# Patient Record
Sex: MALE | Race: WHITE
[De-identification: names, ages, dates, MRNs, and addresses within clinical notes are randomized per-mention and may not be internally consistent; named-entity substitution may affect disease eponyms.]

---

## 2017-04-01 NOTE — ER DOCUMENT REPORT
ED Cardiac





<PELONANNA HOOVEROJ - Last Filed: 04/01/17 07:07>





- General


Time seen by provider: 06:55


Mode of Arrival: Ambulatory


Information source: Patient


TRAVEL OUTSIDE OF THE [place] IN LAST 30 DAYS: No





- HPI


Patient complains to provider of: Chest pain


Associated symptoms: Other - See above





<PARK LOPEZ - Last Filed: 04/01/17 07:28>





- General


Chief Complaint: Chest Pain > 30


Stated Complaint: CHEST PAIN


Notes: 


Patient is a 31 year old male, with a past medical history including POTS and 

anxiety, who presents to the emergency department complaining of chest pain 

intermittent for the past few weeks. Patient states that last night after work 

the pain worsened and he also experienced heat flash, nausea, and a copper 

taste in his mouth. Patient states the pain feels like "spasms" and it will 

sometimes radiate into his back, up his neck, or down his left arm. Patient 

reports that this pain is dissimilar to his POTS and anxiety in that it is more 

direct and makes his nauseated. Patient states he has been under a lot of 

stress recently and that he stopped taking his Inderal a year ago due to cost 

issues. Patient has no family history of ischemic coronary artery disease.     (

PARK LOPEZ)





- Related Data


Allergies/Adverse Reactions: 


 





tramadol HCl [From Ultram] Allergy (Severe, Verified 04/01/17 05:46)


 Swelling of Throat











Past Medical History





- General


Information source: Patient





- Social History


Smoking Status: Never Smoker


Chew tobacco use (# tins/day): No


Frequency of alcohol use: Occasional


Drug Abuse: None


Family History: Reviewed & Not Pertinent, Other - Migraine





- Past Medical History


Cardiac Medical History: Reports: Hx Hypertension - DX MORE THAN FIVE YEARS AGO


Pulmonary Medical History: Reports: Hx Asthma - DX AT AGE [age]


Neurological Medical History: Reports: Hx Seizures


GI Medical History: Reports: Hx Irritable Bowel


Psychiatric Medical History: Reports: Hx Anxiety - DX IN 2008, Hx Bipolar 

Disorder - DX IN 2008, Hx Depression - DX IN 2008, Hx Post Traumatic Stress 

Disorder - DX IN 2008


Past Surgical History: Reports: Hx Abdominal Surgery - APPENDIX, Hx Appendectomy

, Hx Orthopedic Surgery - left wrist





- Immunizations


Immunizations up to date: Yes


Hx Diphtheria, Pertussis, Tetanus Vaccination: Yes





<PARK LOPEZ - Last Filed: 04/01/17 07:28>





Review of Systems





- Review of Systems


Constitutional: See HPI, Other - hot flash


EENT: No symptoms reported


Cardiovascular: See HPI, Chest pain


Respiratory: No symptoms reported


Gastrointestinal: See HPI, Nausea - and "copper taste"


Genitourinary: No symptoms reported


Male Genitourinary: No symptoms reported


Musculoskeletal: No symptoms reported


Skin: No symptoms reported


Hematologic/Lymphatic: No symptoms reported


Neurological/Psychological: No symptoms reported


-: Yes All other systems reviewed and negative





<PARK LOPEZ - Last Filed: 04/01/17 07:28>





Physical Exam





- Vital signs


Interpretation: Normal





- General


General appearance: Appears well, Alert





- HEENT


Head: Normocephalic, Atraumatic





- Respiratory


Respiratory status: No respiratory distress


Chest status: Tender - Tenderness to palpation of left anterior chest wall that 

reproduces the chief complaint


Breath sounds: Normal


Chest palpation: Normal





- Cardiovascular


Rhythm: Regular


Heart sounds: Normal auscultation


Murmur: No





- Abdominal


Inspection: Normal


Distension: No distension


Bowel sounds: Normal


Tenderness: Nontender


Organomegaly: No organomegaly





- Back


Back: Normal, Nontender





- Extremities


General upper extremity: Normal inspection


General lower extremity: Normal inspection





- Neurological


Neuro grossly intact: Yes


Cognition: Normal


Orientation: AAOx4


Barrington Coma Scale Eye Opening: Spontaneous


Barrington Coma Scale Verbal: Oriented


Fady Coma Scale Motor: Obeys Commands


Barrington Coma Scale Total: 15


Speech: Normal





- Psychological


Associated symptoms: Anxious - admitted by patient





- Skin


Skin Temperature: Warm


Skin Moisture: Dry


Skin Color: Normal





<PARK LOPEZ - Last Filed: 04/01/17 07:28>





- Vital signs


Vitals: 


 











Temp Pulse Resp BP Pulse Ox


 


 97.8 F   95   14   148/97 H  98 


 


 04/01/17 05:48  04/01/17 05:48  04/01/17 05:48  04/01/17 05:48  04/01/17 05:48














Course





- Laboratory


Result Diagrams: 


 04/01/17 06:15





 04/01/17 06:15





- EKG Interpretation by Me


EKG shows normal: Sinus rhythm, Axis, Intervals, QRS Complexes, ST-T Waves


Rate: Normal - 86


Rhythm: NSR





<OJ BIRD - Last Filed: 04/01/17 07:07>





- Laboratory


Result Diagrams: 


 04/01/17 06:15





 04/01/17 06:15





<PARK LOPEZ - Last Filed: 04/01/17 07:28>





- Vital Signs


Vital signs: 


 











Temp Pulse Resp BP Pulse Ox


 


 98.1 F   95   17   132/84 H  97 


 


 04/01/17 07:01  04/01/17 05:48  04/01/17 07:01  04/01/17 07:01  04/01/17 07:01














- Laboratory


Laboratory results interpreted by me: 


 











  04/01/17





  06:15


 


WBC  10.8 H


 


Eosinophils %  7.6 H


 


Absolute Eosinophils  0.8 H














Discharge





<OJ BIRD - Last Filed: 04/01/17 07:07>





<PARK LOPEZ - Last Filed: 04/01/17 07:28>





- Discharge


Clinical Impression: 


 Chest wall pain, Anxiety, Has run out of medications, POTS (postural 

orthostatic tachycardia syndrome)





Condition: Stable


Disposition: HOME, SELF-CARE


Additional Instructions: 


Chest Wall Pain:





   Your chest pain has been diagnosed as coming from the chest wall.  This is 

often caused by straining the muscles or joints in the chest during physical 

activity, direct trauma, coughing, or vigorous vomiting.  Persons with 

arthritis are especially prone to this type of pain, due to inflammation of the 

cartilage joints near the breast bone.  Occasionally, no cause can be found.


   Rest from strenuous physical activity.  This kind of chest pain is usually 

made worse by movement of the chest.  Depending on the symptoms, we may 

prescribe medicine for pain, muscle relaxation, and antiinflammatory effects.


   If the pain is new, and seems to be due to muscle strain, cold packs can 

help. Otherwise, apply gentle warmth to the painful area for 15 minutes every 

hour or two.   


   You should contact the doctor immediately if things change.  Further 

evaluation is needed if you develop a fever or cough, if the nature of the pain 

changes, or if you become short of breath.


Prescriptions: 


Propranolol HCl 40 mg PO Q8 #90 tablet


Referrals: 


CHRIS DENTON DO [Primary Care Provider] - Follow up as needed


Scribe Attestation: 





04/01/17 07:06


I personally performed the services described in the documentation, reviewed 

and edited the documentation which was dictated to the scribe in my presence, 

and it accurately records my words and actions. (OJ BIRD)





Scribe Documentation





- Scribe


Written by Scribe:: kip Castaneda, 4/1/17, 0728


acting as scribe for :: Pelon





<PARK LOPEZ - Last Filed: 04/01/17 07:28>

## 2018-04-06 ENCOUNTER — HOSPITAL ENCOUNTER (EMERGENCY)
Dept: HOSPITAL 62 - ER | Age: 33
LOS: 1 days | Discharge: HOME | End: 2018-04-07
Payer: SELF-PAY

## 2018-04-06 DIAGNOSIS — N45.2: Primary | ICD-10-CM

## 2018-04-06 DIAGNOSIS — N50.812: ICD-10-CM

## 2018-04-06 LAB
APPEARANCE UR: CLEAR
APTT PPP: YELLOW S
BILIRUB UR QL STRIP: NEGATIVE
CHLAM PCR: NOT DETECTED
GLUCOSE UR STRIP-MCNC: NEGATIVE MG/DL
GON PCR: NOT DETECTED
KETONES UR STRIP-MCNC: NEGATIVE MG/DL
NITRITE UR QL STRIP: NEGATIVE
PH UR STRIP: 5 [PH] (ref 5–9)
PROT UR STRIP-MCNC: NEGATIVE MG/DL
SP GR UR STRIP: 1.03
UROBILINOGEN UR-MCNC: 2 MG/DL (ref ?–2)

## 2018-04-06 PROCEDURE — 87086 URINE CULTURE/COLONY COUNT: CPT

## 2018-04-06 PROCEDURE — 87591 N.GONORRHOEAE DNA AMP PROB: CPT

## 2018-04-06 PROCEDURE — 99284 EMERGENCY DEPT VISIT MOD MDM: CPT

## 2018-04-06 PROCEDURE — 93976 VASCULAR STUDY: CPT

## 2018-04-06 PROCEDURE — 87491 CHLMYD TRACH DNA AMP PROBE: CPT

## 2018-04-06 PROCEDURE — 76870 US EXAM SCROTUM: CPT

## 2018-04-06 PROCEDURE — 81001 URINALYSIS AUTO W/SCOPE: CPT

## 2018-04-06 NOTE — ER DOCUMENT REPORT
ED General





- General


Chief Complaint: Scrotal Pain, Acute Onset


Stated Complaint: LEFT TESTICLE PAIN


Time Seen by Provider: 04/06/18 20:44


Notes: 





Patient is a 32-year-old male without chronic medical problems who presents for 

24 hours of left testicular pain.  He describes it as a progressively worsening 

severe, constant throbbing pain to the left testicle.  Nothing improves or 

worsens the pain.  He states that he had a similar episode approximately 1 

month ago although not to this degree of severity.  He states that he had an 

appropriate workup at that time as to the etiology of his pain and it did 

spontaneously resolve.  He does note that his semen has been discolored over 

the past 24 hours having a brownish tinge to it.  He also has noted some 

dysuria today.  He notes that he was recently tested for sexually transmitted 

infections and all results were normal.  He denies any fever or constitutional 

symptoms.  No focal abdominal tenderness or flank tenderness.  He has not seen 

his primary care doctor regarding today's concerns.


TRAVEL OUTSIDE OF THE [place] IN LAST 30 DAYS: No





- Related Data


Allergies/Adverse Reactions: 


 





tramadol HCl [From Ultram] Allergy (Severe, Verified 04/01/17 05:46)


 Swelling of Throat











Past Medical History





- General


Information source: Patient





- Social History


Smoking Status: Never Smoker


Frequency of alcohol use: None


Drug Abuse: None


Lives with: Alone


Family History: Reviewed & Not Pertinent, Other - Migraine


Patient has suicidal ideation: No


Patient has homicidal ideation: No





- Past Medical History


Cardiac Medical History: Reports: Hx Hypertension - DX MORE THAN FIVE YEARS AGO


   Denies: Hx Coronary Artery Disease, Hx Heart Attack


Pulmonary Medical History: Reports: Hx Asthma - DX AT AGE [age]


   Denies: Hx Bronchitis, Hx COPD, Hx Pneumonia


Neurological Medical History: Reports: Hx Seizures.  Denies: Hx Cerebrovascular 

Accident


Endocrine Medical History: Denies: Hx Diabetes Mellitus Type 2


Renal/ Medical History: Denies: Hx Peritoneal Dialysis


GI Medical History: Reports: Hx Irritable Bowel.  Denies: Hx Crohn's Disease, 

Hx Gastritis, Hx Gastroesophageal Reflux Disease, Hx Hepatitis


Musculoskeltal Medical History: Denies Hx Arthritis


Skin Medical History: Denies Hx MRSA


Psychiatric Medical History: Reports: Hx Anxiety - DX IN 2008, Hx Bipolar 

Disorder - DX IN 2008, Hx Depression - DX IN 2008, Hx Post Traumatic Stress 

Disorder - DX IN 2008


Infectious Medical History: Denies: Hx Hepatitis, Hx MRSA


Past Surgical History: Reports: Hx Abdominal Surgery - APPENDIX, Hx Appendectomy

, Hx Orthopedic Surgery - left wrist.  Denies: Hx Adenoidectomy, Hx Pacemaker





- Immunizations


Immunizations up to date: Yes


Hx Diphtheria, Pertussis, Tetanus Vaccination: Yes





Review of Systems





- Review of Systems


Notes: 





Constitutional: Negative for fever.


HENT: Negative for sore throat.


Eyes: Negative for visual changes.


Cardiovascular: Negative for chest pain.


Respiratory: Negative for shortness of breath.


Gastrointestinal: Negative for abdominal pain, vomiting or diarrhea.


Genitourinary: Positive for dysuria and left testicular tenderness


Musculoskeletal: Negative for back pain.


Skin: Negative for rash.


Neurological: Negative for headaches, weakness or numbness.





10 point ROS negative except as marked above and in HPI.





Physical Exam





- Vital signs


Vitals: 


 











Temp Pulse Resp BP Pulse Ox


 


 98.0 F   85   17   128/85 H  98 


 


 04/07/18 00:08  04/07/18 00:08  04/07/18 00:08  04/07/18 00:08  04/07/18 00:08











Interpretation: Normal


Notes: 





PHYSICAL EXAMINATION:





GENERAL: Appears uncomfortable but no acute distress





HEAD: Atraumatic, normocephalic.





EYES: Pupils equal round and reactive to light, extraocular movements intact, 

sclera anicteric, conjunctiva are normal.





ENT: nares patent, oropharynx clear without exudates.  Moist mucous membranes.





NECK: Normal range of motion, supple without lymphadenopathy





LUNGS: Breath sounds clear to auscultation bilaterally and equal.  No wheezes 

rales or rhonchi.





HEART: Regular rate and rhythm without murmurs





ABDOMEN: Soft, nontender, normoactive bowel sounds.  No guarding, no rebound.  

No masses appreciated.





: Normal testicular lie bilaterally.  Absent cremasteric reflex bilaterally.  

Focal tenderness on palpation of the left testicle and epididymis.  No hernia.  

No inguinal lymphadenopathy





EXTREMITIES: Normal range of motion, no pitting or edema.  No cyanosis.





NEUROLOGICAL: No focal neurological deficits. Moves all extremities 

spontaneously and on command.





PSYCH: Normal mood, normal affect.





SKIN: Warm, Dry, normal turgor, no rashes or lesions noted.





Course





- Re-evaluation


Re-evalutation: 





04/06/18 22:18


Patient presents with approximately 12 hours of left testicular pain that has 

been progressively worsening since onset.  He has a history of a similar event 

although not to this degree of severity approximately 1 year ago that was 

unclear in the etiology.  Patient appears to be in significant discomfort.  On 

testicular exam, he has equal testicular lie, absent cremasteric reflex 

bilaterally.  Exquisite tenderness on palpation of the testicle and epididymis 

on the left, no tenderness on the right.  Urinalysis does suggest signs of a 

urinary tract infection suggesting possible orchitis or epididymitis.  

Alternative considerations would certainly include a testicular torsion.  I did 

order an ultrasound immediately upon evaluating this patient and he has not 

gone over an hour after I order the ultrasound.  I have emphasized with the 

ultrasound department and nursing staff that he needs to go for the ultrasound 

immediately to rule out for testicular torsion


04/06/18 23:34


Testicular ultrasound is not visualized of acute testicular torsion.  Given 

patient's urinalysis and focal left testicular tenderness most worrisome for 

possible orchitis.  Gonorrhea and Chlamydia are negative.  Will start on a seven

-day course of cephalexin, urine culture is pending.  I have provided the 

patient urology follow-up.  At this time will discharge with return precautions 

and follow-up recommendations.  Verbal discharge instructions given a the 

bedside and opportunity for questions given. Medication warnings reviewed. 

Patient is in agreement with this plan and has verbalized understanding of 

return precautions and the need for primary care follow-up in the next 24-72 

hours.





- Vital Signs


Vital signs: 


 











Temp Pulse Resp BP Pulse Ox


 


 98.0 F   85   17   128/85 H  98 


 


 04/07/18 00:08  04/07/18 00:08  04/07/18 00:08  04/07/18 00:08  04/07/18 00:08














- Laboratory


Laboratory results interpreted by me: 


 











  04/06/18





  20:56


 


Urine Urobilinogen  2.0 H














- Diagnostic Test


Radiology reviewed: Reports reviewed





Discharge





- Discharge


Clinical Impression: 


 Orchitis, Left testicular pain





Condition: Good


Disposition: HOME, SELF-CARE


Additional Instructions: 


You were seen for painful left testicle.  Your ultrasound does not show any 

evidence of a twisted testicle.  Your urine does however appear to be infected.

  Your gonorrhea and chlamydia screens are negative.  Your being started on a 

seven-day course of antibiotics to treat a likely infection based on your 

urinalysis findings.  For your pain: Take ibuprofen 600 mg and acetaminophen 

1000 mg every 6 hours together as needed for pain.  If this does not control 

your pain you may take 15 mg of oral morphine every 4 hours as needed.  Please 

be very careful about using the oral morphine and only use this for severe 

pain.  You should follow-up with urology within the next 2-3 days particularly 

if your pain is not improving.  Return to the emergency department immediately 

if you develop fever, worsening pain, persistent vomiting, or any other 

symptoms that are worrisome to you.


Prescriptions: 


Morphine Sulfate [Morphine Ir 15 mg Tablet] 15 mg PO Q4HP PRN #6 tablet


 PRN Reason: 


Cephalexin Monohydrate [Keflex 500 mg Capsule] 500 mg PO Q6H 7 Days  capsule


Referrals: 


CHRIS DENTON DO [Primary Care Provider] - Follow up as needed


CALEB GASPAR II, MD [[facility]] - Follow up in 3-5 days

## 2018-04-06 NOTE — RADIOLOGY REPORT (SQ)
EXAM DESCRIPTION:  U/S SCROTUM W/DOPPLER



COMPLETED DATE/TIME:  4/6/2018 11:05 pm



REASON FOR STUDY:  left testicle pain



COMPARISON:  None.



TECHNIQUE:  Static and realtime gray scale imaging of the scrotum and testes.  Selected color Doppler
 and spectral images recorded to document blood flow.



LIMITATIONS:  None.



FINDINGS:  RIGHT:

TESTICLE: Normal size. Normal echotexture.  Normal blood flow.  No mass.

EPIDIDYMIS: Normal.

HYDROCELE OR VARICOCELE: Small hydrocele No varicocele.

HERNIA OR EXTRA-TESTICULAR MASS: No.

OTHER: No other significant finding.

LEFT:

TESTICLE: Normal size. Normal echotexture.  Normal blood flow.  No mass.

EPIDIDYMIS: Normal.

HYDROCELE OR VARICOCELE: Small hydrocele No varicocele.

HERNIA OR EXTRA-TESTICULAR MASS: No.

OTHER: No other significant finding.



IMPRESSION:  Small bilateral hydroceles. NO EVIDENCE OF TESTICULAR MASS OR TORSION.



TECHNICAL DOCUMENTATION:  JOB ID:  [id number]

[id number]

 2011 [employer]- All Rights Reserved



Reading location - IP/workstation name: [employer] [facility]

## 2018-04-07 VITALS — DIASTOLIC BLOOD PRESSURE: 85 MMHG | SYSTOLIC BLOOD PRESSURE: 128 MMHG

## 2018-04-10 ENCOUNTER — HOSPITAL ENCOUNTER (INPATIENT)
Dept: HOSPITAL 62 - ER | Age: 33
LOS: 8 days | Discharge: HOME | DRG: 728 | End: 2018-04-18
Attending: INTERNAL MEDICINE | Admitting: INTERNAL MEDICINE
Payer: SELF-PAY

## 2018-04-10 DIAGNOSIS — F31.9: ICD-10-CM

## 2018-04-10 DIAGNOSIS — F43.10: ICD-10-CM

## 2018-04-10 DIAGNOSIS — A18.01: ICD-10-CM

## 2018-04-10 DIAGNOSIS — N49.2: ICD-10-CM

## 2018-04-10 DIAGNOSIS — I10: ICD-10-CM

## 2018-04-10 DIAGNOSIS — F41.9: ICD-10-CM

## 2018-04-10 DIAGNOSIS — N43.3: ICD-10-CM

## 2018-04-10 DIAGNOSIS — N45.3: Primary | ICD-10-CM

## 2018-04-10 DIAGNOSIS — Z88.6: ICD-10-CM

## 2018-04-10 DIAGNOSIS — K58.9: ICD-10-CM

## 2018-04-10 LAB
ADD MANUAL DIFF: NO
ANION GAP SERPL CALC-SCNC: 14 MMOL/L (ref 5–19)
APPEARANCE UR: CLEAR
APTT PPP: YELLOW S
BASOPHILS # BLD AUTO: 0.1 10^3/UL (ref 0–0.2)
BASOPHILS NFR BLD AUTO: 0.4 % (ref 0–2)
BILIRUB UR QL STRIP: NEGATIVE
BUN SERPL-MCNC: 15 MG/DL (ref 7–20)
CALCIUM: 9.6 MG/DL (ref 8.4–10.2)
CHLORIDE SERPL-SCNC: 103 MMOL/L (ref 98–107)
CO2 SERPL-SCNC: 26 MMOL/L (ref 22–30)
EOSINOPHIL # BLD AUTO: 0.5 10^3/UL (ref 0–0.6)
EOSINOPHIL NFR BLD AUTO: 2.8 % (ref 0–6)
ERYTHROCYTE [DISTWIDTH] IN BLOOD BY AUTOMATED COUNT: 13.5 % (ref 11.5–14)
GLUCOSE SERPL-MCNC: 97 MG/DL (ref 75–110)
GLUCOSE UR STRIP-MCNC: NEGATIVE MG/DL
HCT VFR BLD CALC: 44.7 % (ref 37.9–51)
HGB BLD-MCNC: 15 G/DL (ref 13.5–17)
KETONES UR STRIP-MCNC: NEGATIVE MG/DL
LYMPHOCYTES # BLD AUTO: 2.8 10^3/UL (ref 0.5–4.7)
LYMPHOCYTES NFR BLD AUTO: 14.8 % (ref 13–45)
MCH RBC QN AUTO: 28.2 PG (ref 27–33.4)
MCHC RBC AUTO-ENTMCNC: 33.6 G/DL (ref 32–36)
MCV RBC AUTO: 84 FL (ref 80–97)
MONOCYTES # BLD AUTO: 2 10^3/UL (ref 0.1–1.4)
MONOCYTES NFR BLD AUTO: 10.8 % (ref 3–13)
NEUTROPHILS # BLD AUTO: 13.3 10^3/UL (ref 1.7–8.2)
NEUTS SEG NFR BLD AUTO: 71.2 % (ref 42–78)
NITRITE UR QL STRIP: NEGATIVE
PH UR STRIP: 6 [PH] (ref 5–9)
PLATELET # BLD: 312 10^3/UL (ref 150–450)
POTASSIUM SERPL-SCNC: 4.1 MMOL/L (ref 3.6–5)
PROT UR STRIP-MCNC: NEGATIVE MG/DL
RBC # BLD AUTO: 5.33 10^6/UL (ref 4.35–5.55)
SODIUM SERPL-SCNC: 143 MMOL/L (ref 137–145)
SP GR UR STRIP: 1.01
TOTAL CELLS COUNTED % (AUTO): 100 %
UROBILINOGEN UR-MCNC: NEGATIVE MG/DL (ref ?–2)
WBC # BLD AUTO: 18.7 10^3/UL (ref 4–10.5)

## 2018-04-10 PROCEDURE — 85025 COMPLETE CBC W/AUTO DIFF WBC: CPT

## 2018-04-10 PROCEDURE — 87086 URINE CULTURE/COLONY COUNT: CPT

## 2018-04-10 PROCEDURE — 87070 CULTURE OTHR SPECIMN AEROBIC: CPT

## 2018-04-10 PROCEDURE — 81001 URINALYSIS AUTO W/SCOPE: CPT

## 2018-04-10 PROCEDURE — 96375 TX/PRO/DX INJ NEW DRUG ADDON: CPT

## 2018-04-10 PROCEDURE — 86140 C-REACTIVE PROTEIN: CPT

## 2018-04-10 PROCEDURE — 99285 EMERGENCY DEPT VISIT HI MDM: CPT

## 2018-04-10 PROCEDURE — 36415 COLL VENOUS BLD VENIPUNCTURE: CPT

## 2018-04-10 PROCEDURE — 76770 US EXAM ABDO BACK WALL COMP: CPT

## 2018-04-10 PROCEDURE — 87040 BLOOD CULTURE FOR BACTERIA: CPT

## 2018-04-10 PROCEDURE — 80048 BASIC METABOLIC PNL TOTAL CA: CPT

## 2018-04-10 PROCEDURE — 76870 US EXAM SCROTUM: CPT

## 2018-04-10 PROCEDURE — 93976 VASCULAR STUDY: CPT

## 2018-04-10 PROCEDURE — 96365 THER/PROPH/DIAG IV INF INIT: CPT

## 2018-04-10 PROCEDURE — 72193 CT PELVIS W/DYE: CPT

## 2018-04-10 PROCEDURE — 96361 HYDRATE IV INFUSION ADD-ON: CPT

## 2018-04-10 PROCEDURE — 96376 TX/PRO/DX INJ SAME DRUG ADON: CPT

## 2018-04-10 PROCEDURE — 87205 SMEAR GRAM STAIN: CPT

## 2018-04-10 NOTE — ER DOCUMENT REPORT
ED General





- General


Chief Complaint: Testicular pain/ swelling


Stated Complaint: TESTICULAR PAIN


Time Seen by Provider: 04/10/18 22:08


Notes: 





Patient is a 32-year-old male who presents with complaint of swelling 

increasing pain to the left testicle.  He was seen on Friday and diagnosed with 

full epididymitis.  His ultrasound that time was negative.  He started Keflex 

has been taking it as prescribed.  Said today his left scrotal region and 

testicle started to swell a large amount and is become very painful.  No 

fevers.  No vomiting.  No abdominal pain.  He was negative for gonorrhea 

chlamydia on Friday.  No recent trauma or injuries.  No sexual activity but he 

has masturbated.  He says that his ejaculate is somewhat bloody.


TRAVEL OUTSIDE OF THE [place] IN LAST 30 DAYS: No





- Related Data


Allergies/Adverse Reactions: 


 





tramadol HCl [From Ultram] Allergy (Severe, Verified 04/01/17 05:46)


 Swelling of Throat











Past Medical History





- Social History


Smoking Status: Never Smoker


Chew tobacco use (# tins/day): No


Frequency of alcohol use: Occasional


Drug Abuse: Marijuana


Family History: Reviewed & Not Pertinent, Other - Migraine


Patient has suicidal ideation: No


Patient has homicidal ideation: No





- Past Medical History


Cardiac Medical History: Reports: Hx Hypertension - DX MORE THAN FIVE YEARS AGO


   Denies: Hx Coronary Artery Disease, Hx Heart Attack


Pulmonary Medical History: Reports: Hx Asthma - DX AT AGE [age]


   Denies: Hx Bronchitis, Hx COPD, Hx Pneumonia


Neurological Medical History: Reports: Hx Seizures.  Denies: Hx Cerebrovascular 

Accident


Endocrine Medical History: Denies: Hx Diabetes Mellitus Type 2


Renal/ Medical History: Denies: Hx Peritoneal Dialysis


GI Medical History: Reports: Hx Irritable Bowel.  Denies: Hx Crohn's Disease, 

Hx Gastritis, Hx Gastroesophageal Reflux Disease, Hx Hepatitis


Musculoskeltal Medical History: Denies Hx Arthritis


Skin Medical History: Denies Hx MRSA


Psychiatric Medical History: Reports: Hx Anxiety - DX IN 2008, Hx Bipolar 

Disorder - DX IN 2008, Hx Depression - DX IN 2008, Hx Post Traumatic Stress 

Disorder - DX IN 2008


Infectious Medical History: Denies: Hx Hepatitis, Hx MRSA


Past Surgical History: Reports: Hx Abdominal Surgery - APPENDIX, Hx Appendectomy

, Hx Orthopedic Surgery - left wrist.  Denies: Hx Adenoidectomy, Hx Pacemaker





- Immunizations


Immunizations up to date: Yes


Hx Diphtheria, Pertussis, Tetanus Vaccination: Yes





Review of Systems





- Review of Systems


Notes: 





My Normal Review Basic





REVIEW OF SYSTEMS:


CONSTITUTIONAL :  Denies fever,  chills, or sweats.  Denies recent illness.


GASTROINTESTINAL:  Denies abdominal pain.  Denies nausea, vomiting, or 

diarrhea.  Denies constipation.  Last BM: 


GENITOURINARY:  Denies difficulty urinating, painful urination, burning, 

frequency, or blood in urine.


Male genital: Swelling and redness to left scrotal region.


SKIN:   Denies rash or skin lesions.


NEUROLOGICAL:  Denies altered mental status or loss of consciousness.  Denies 

headache.  Denies weakness or paralysis or loss of use of either side.  Denies 

problems with gait or speech.  Denies sensory or motor loss.


ALL OTHER SYSTEMS REVIEWED AND NEGATIVE.





Physical Exam





- Vital signs


Vitals: 


 











Temp Pulse Resp BP Pulse Ox


 


 98.1 F   134 H  16   151/96 H  97 


 


 04/10/18 21:32  04/10/18 21:32  04/10/18 21:32  04/10/18 21:32  04/10/18 21:32














- Notes


Notes: 





General Appearance: Well nourished, alert, cooperative, no acute distress, 

moderate obvious discomfort.


Vitals: reviewed, See vital signs table.


Eyes: PERRL, EOMI, Conjuctiva clear


Abdomen: Normal BS, soft, No rigidity, No abdominal tenderness, No guarding, no 

rebound, no abdominal masses, no organomegaly


Genital: Patient has large amount of swelling and redness to the left scrotal 

area and is firm however left scrotal area.  Tender to touch.


Skin: warm, dry, appropriate color, no rash


Neuro: speech clear, oriented x 3, normal affect, responds appropriately to 

questions.





Course





- Re-evaluation


Re-evalutation: 





04/11/18 00:14


Ultrasound shows worsening orchitis.  No evidence of abscess at this time.  We 

have urology coverage starting at 7 AM.  I did give him a dose of IV Rocephin.  

I did speak with the hospitalist Dr. Alba who agrees to admit the patient 

for IV antibiotics and consult with urology.  I did explain the plan to the 

patient and he is agreeable to it.  Patient is on propranolol for pots.  He did 

not have his evening dose today.  This may be why he is even more tachycardic.  

I think he is tachycardic because of that in conjunction with with the pain 

from his arthritis.  I will give a dose of his propranolol being that he has 

not had it.





Dictation of this chart was performed using voice recognition software; 

therefore, there may be some unintended grammatical errors.


04/11/18 00:15








- Vital Signs


Vital signs: 


 











Temp Pulse Resp BP Pulse Ox


 


 98.1 F   134 H  16   151/96 H  97 


 


 04/10/18 21:32  04/10/18 21:32  04/10/18 21:32  04/10/18 21:32  04/10/18 21:32














- Laboratory


Result Diagrams: 


 04/10/18 22:25





 04/10/18 22:25


Laboratory results interpreted by me: 


 











  04/10/18 04/10/18





  22:25 22:30


 


WBC  18.7 H 


 


Absolute Neutrophils  13.3 H 


 


Absolute Monocytes  2.0 H 


 


Urine Blood   SMALL H


 


Ur Leukocyte Esterase   SMALL H














Discharge





- Discharge


Clinical Impression: 


 Orchiditis





Condition: Stable


Disposition: ADMITTED AS OBSERVATION


Admitting Provider: Hospitalist


Unit Admitted: Medical Floor

## 2018-04-10 NOTE — RADIOLOGY REPORT (SQ)
EXAM DESCRIPTION: 



U/S SCROTUM W/DOPPLER



CLINICAL HISTORY: 



32 years, Male, testicular pain



COMPARISON:

None.



LIMITATIONS:

None.



FINDINGS:



Heterogeneous, edematous appearance of the 1.8 x 1.6 x 1.3 cm

left epididymis with increased vascularity. Mild increased

vascularity of the left testis. Small septated left hydrocele.

Asymmetric scrotal wall thickening measures 1.6 cm.



Small right hydrocele.



Else, 1.2 x 0.7 x 0.9 cm right epididymis, 4.2 cm right testis,

3.8 cm left testis, appear otherwise unremarkable. No testicular

torsion or mass.



IMPRESSION:



1. Moderate left epididymoorchitis. Asymmetric left paracentral

scrotal wall thickening measures 1.6 cm and may indicate scrotal

cellulitis.

2. No testicular torsion or mass.

## 2018-04-11 LAB
ADD MANUAL DIFF: NO
ANION GAP SERPL CALC-SCNC: 9 MMOL/L (ref 5–19)
BASOPHILS # BLD AUTO: 0.1 10^3/UL (ref 0–0.2)
BASOPHILS NFR BLD AUTO: 0.5 % (ref 0–2)
BUN SERPL-MCNC: 14 MG/DL (ref 7–20)
CALCIUM: 8.7 MG/DL (ref 8.4–10.2)
CHLORIDE SERPL-SCNC: 107 MMOL/L (ref 98–107)
CO2 SERPL-SCNC: 27 MMOL/L (ref 22–30)
EOSINOPHIL # BLD AUTO: 0.5 10^3/UL (ref 0–0.6)
EOSINOPHIL NFR BLD AUTO: 2.9 % (ref 0–6)
ERYTHROCYTE [DISTWIDTH] IN BLOOD BY AUTOMATED COUNT: 13 % (ref 11.5–14)
GLUCOSE SERPL-MCNC: 123 MG/DL (ref 75–110)
HCT VFR BLD CALC: 39.2 % (ref 37.9–51)
HGB BLD-MCNC: 13 G/DL (ref 13.5–17)
LYMPHOCYTES # BLD AUTO: 2.3 10^3/UL (ref 0.5–4.7)
LYMPHOCYTES NFR BLD AUTO: 13.2 % (ref 13–45)
MCH RBC QN AUTO: 27.7 PG (ref 27–33.4)
MCHC RBC AUTO-ENTMCNC: 33.1 G/DL (ref 32–36)
MCV RBC AUTO: 84 FL (ref 80–97)
MONOCYTES # BLD AUTO: 1.7 10^3/UL (ref 0.1–1.4)
MONOCYTES NFR BLD AUTO: 9.6 % (ref 3–13)
NEUTROPHILS # BLD AUTO: 13.1 10^3/UL (ref 1.7–8.2)
NEUTS SEG NFR BLD AUTO: 73.8 % (ref 42–78)
PLATELET # BLD: 248 10^3/UL (ref 150–450)
POTASSIUM SERPL-SCNC: 4.2 MMOL/L (ref 3.6–5)
RBC # BLD AUTO: 4.69 10^6/UL (ref 4.35–5.55)
SODIUM SERPL-SCNC: 142.6 MMOL/L (ref 137–145)
TOTAL CELLS COUNTED % (AUTO): 100 %
WBC # BLD AUTO: 17.7 10^3/UL (ref 4–10.5)

## 2018-04-11 PROCEDURE — 3E0F73Z INTRODUCTION OF ANTI-INFLAMMATORY INTO RESPIRATORY TRACT, VIA NATURAL OR ARTIFICIAL OPENING: ICD-10-PCS | Performed by: INTERNAL MEDICINE

## 2018-04-11 RX ADMIN — DOCUSATE SODIUM SCH MG: 100 CAPSULE, LIQUID FILLED ORAL at 09:18

## 2018-04-11 RX ADMIN — CEFTRIAXONE SODIUM SCH MG: 1 INJECTION, POWDER, FOR SOLUTION INTRAMUSCULAR; INTRAVENOUS at 09:43

## 2018-04-11 RX ADMIN — ACETAMINOPHEN SCH MG: 325 TABLET ORAL at 09:18

## 2018-04-11 RX ADMIN — HEPARIN SODIUM SCH UNIT: 5000 INJECTION, SOLUTION INTRAVENOUS; SUBCUTANEOUS at 06:55

## 2018-04-11 RX ADMIN — IBUPROFEN SCH MG: 400 TABLET ORAL at 14:54

## 2018-04-11 RX ADMIN — IBUPROFEN SCH MG: 400 TABLET ORAL at 21:26

## 2018-04-11 RX ADMIN — HEPARIN SODIUM SCH UNIT: 5000 INJECTION, SOLUTION INTRAVENOUS; SUBCUTANEOUS at 21:26

## 2018-04-11 RX ADMIN — ACETAMINOPHEN SCH MG: 325 TABLET ORAL at 00:15

## 2018-04-11 RX ADMIN — HEPARIN SODIUM SCH UNIT: 5000 INJECTION, SOLUTION INTRAVENOUS; SUBCUTANEOUS at 15:04

## 2018-04-11 RX ADMIN — ACETAMINOPHEN SCH MG: 325 TABLET ORAL at 17:56

## 2018-04-11 RX ADMIN — OXYCODONE HYDROCHLORIDE PRN MG: 5 TABLET ORAL at 02:27

## 2018-04-11 RX ADMIN — OXYCODONE HYDROCHLORIDE PRN MG: 5 TABLET ORAL at 09:19

## 2018-04-11 RX ADMIN — ACETAMINOPHEN SCH MG: 325 TABLET ORAL at 00:50

## 2018-04-11 RX ADMIN — DOCUSATE SODIUM SCH MG: 100 CAPSULE, LIQUID FILLED ORAL at 17:50

## 2018-04-11 RX ADMIN — OXYCODONE HYDROCHLORIDE PRN MG: 5 TABLET ORAL at 17:50

## 2018-04-11 NOTE — PDOC PROGRESS REPORT
Subjective


Progress Note for:: 04/11/18


Subjective:: 





Admitted overnight for epididymitis.  Complains of pain in scrotum.  No fever 

or chills, no chest pain or shortness of breath or palpitations.  Patient seen 

by urologist Leoncio today and he stated no abscess, and no surgical intervention 

needed at this time.  Recommend continue Rocephin, around-the-clock ibuprofen, 

elevate the scrotum.


Reason For Visit: 


EPIDIDYMITMS








Physical Exam


Vital Signs: 


 











Temp Pulse Resp BP Pulse Ox


 


 98.9 F   94   16   109/71   96 


 


 04/11/18 12:00  04/11/18 12:00  04/11/18 12:00  04/11/18 12:00  04/11/18 12:00








 Intake & Output











 04/10/18 04/11/18 04/12/18





 06:59 06:59 06:59


 


Weight  104.2 kg 








GENERAL: Well-developed, no acute distress


HEENT: Normocephalic/atraumatic


NECK supple, no JVD


CARDIOVASCULAR: RRR, normal S1-S2


LUNGS: CTA bilaterally


ABDOMEN: Soft, NT, NL bowel sounds


EXTREMITIES: No edema, clubbing, cyanosis


: Significant edema of scrotum with some redness, my involvement, tenderness


NEUROLOGICAL: Alert, oriented x 3, nonfocal








Results


Laboratory Results: 


 





 04/11/18 04:01 





 04/11/18 04:01 





 











  04/11/18 04/11/18





  04:01 04:01


 


WBC  17.7 H 


 


RBC  4.69 


 


Hgb  13.0 L 


 


Hct  39.2 


 


MCV  84 


 


MCH  27.7 


 


MCHC  33.1 


 


RDW  13.0 


 


Plt Count  248 


 


Seg Neutrophils %  73.8 


 


Lymphocytes %  13.2 


 


Monocytes %  9.6 


 


Eosinophils %  2.9 


 


Basophils %  0.5 


 


Absolute Neutrophils  13.1 H 


 


Absolute Lymphocytes  2.3 


 


Absolute Monocytes  1.7 H 


 


Absolute Eosinophils  0.5 


 


Absolute Basophils  0.1 


 


Sodium   142.6


 


Potassium   4.2


 


Chloride   107


 


Carbon Dioxide   27


 


Anion Gap   9


 


BUN   14


 


Creatinine   0.81


 


Est GFR ( Amer)   > 60


 


Est GFR (Non-Af Amer)   > 60


 


Glucose   123 H


 


Calcium   8.7











Impressions: 


 





Scrotum Ultrasound  04/10/18 22:10


IMPRESSION:


 


1. Moderate left epididymoorchitis. Asymmetric left paracentral


scrotal wall thickening measures 1.6 cm and may indicate scrotal


cellulitis.


2. No testicular torsion or mass.


 














Assessment & Plan





- Plan Summary


Plan Summary: 





(1) Orchitis


Is this a current diagnosis for this admission?: Yes   


Plan: 


Continue symptomatic management, empiric Rocephin, follow-up CBC, blood and 

urine culture, urology consult appreciated.





(2) Left testicular pain


Is this a current diagnosis for this admission?: Yes   


Plan: 


Symptomatic management.  Elevate scrotum, around-the-clock ibuprofen by urology 

recommendation.

## 2018-04-11 NOTE — PDOC H&P
History of Present Illness


Admission Date/PCP: 


  04/11/18 00:37





  MERON DENTON MD





Patient complains of: 7 days of left testicular pain


History of Present Illness: 


AUSTIN HARRISON is a 32 year old male with past medical history of Mishra disease 

on propanolol.  Patient presents with follow-up of left testicular epididymitis

, swelling and pain patient was seen treated and discharged 4 days ago with 

Keflex without significant improvement.  In the emergency room CBC reveals 

leukocytosis of 18,000, ultrasound reveals acute epididymitis without torsion 

he started on IV Rocephin for the hospitalist for admission.  Patient admits to 

single episode several years ago, denies exceptional STD risk, Chlamydia 

gonorrhea returned negative from 4 days ago.





Past Medical History


Cardiac Medical History: Reports: Hypertension - DX MORE THAN FIVE YEARS AGO


   Denies: Coronary Artery Disease, Myocardial Infarction


Pulmonary Medical History: Reports: Asthma - DX AT AGE [age]


   Denies: Bronchitis, Chronic Obstructive Pulmonary Disease (COPD), Pneumonia


Neurological Medical History: Reports: Seizures


Endocrine Medical History: 


   Denies: Diabetes Mellitus Type 2


GI Medical History: 


   Denies: Crohn's Disease, Gastroesophageal Reflux Disease, Hepatitis


Musculoskeltal Medical History: 


   Denies: Arthritis


Psychiatric Medical History: Reports: Bipolar Disorder - DX IN 2008, Depression 

- DX IN 2008, Post Traumatic Stress Disorder - DX IN 2008


Hematology: 


   Denies: Anemia


Infectious Medical History: 


   Denies: Methicillin-Resistant Staph Aureus





Past Surgical History


Past Surgical History: Reports: Appendectomy, Orthopedic Surgery - left wrist


   Denies: Pacemaker





Social History


Information Source: Patient, Emergency Med Personnel, [facility] Records


Smoking Status: Never Smoker


Frequency of Alcohol Use: None


Drugs: None





- Advance Directive


Resuscitation Status: Full Code





Family History


Family History: Other - Migraine


Parental Family History Reviewed: Yes


Children Family History Reviewed: Yes


Sibling(s) Family History Reviewed.: Yes





Medication/Allergy


Home Medications: 








Propranolol HCl 40 mg PO Q8 #90 tablet 04/01/17 








Allergies/Adverse Reactions: 


 





tramadol HCl [From Ultram] Allergy (Severe, Verified 04/01/17 05:46)


 Swelling of Throat











Review of Systems


Constitutional: ABSENT: chills, fever(s), headache(s), weight gain, weight loss


Eyes: ABSENT: visual disturbances


Ears: ABSENT: hearing changes


Cardiovascular: ABSENT: chest pain, dyspnea on exertion, edema, orthropnea, 

palpitations


Respiratory: ABSENT: cough, hemoptysis


Gastrointestinal: ABSENT: abdominal pain, constipation, diarrhea, hematemesis, 

hematochezia, nausea, vomiting


Genitourinary: ABSENT: dysuria, hematuria


Musculoskeletal: ABSENT: joint swelling


Integumentary: ABSENT: rash, wounds


Neurological: ABSENT: abnormal gait, abnormal speech, confusion, dizziness, 

focal weakness, syncope


Psychiatric: ABSENT: anxiety, depression, homidical ideation, suicidal ideation


Endocrine: ABSENT: cold intolerance, heat intolerance, polydipsia, polyuria


Hematologic/Lymphatic: ABSENT: easy bleeding, easy bruising





Physical Exam


Vital Signs: 


 











Temp Pulse Resp BP Pulse Ox


 


 99.1 F   111 H  18   117/81   97 


 


 04/11/18 00:36  04/11/18 00:36  04/11/18 00:36  04/11/18 00:36  04/11/18 00:36











General appearance: PRESENT: cooperative, mild distress, well-developed, well-

nourished


Head exam: PRESENT: atraumatic, normocephalic


Eye exam: PRESENT: conjunctiva pink, EOMI, PERRLA.  ABSENT: scleral icterus


Ear exam: PRESENT: normal external ear exam


Mouth exam: PRESENT: moist, tongue midline


Neck exam: ABSENT: carotid bruit, JVD, lymphadenopathy, thyromegaly


Respiratory exam: PRESENT: clear to auscultation diamond.  ABSENT: rales, rhonchi, 

wheezes


Cardiovascular exam: PRESENT: RRR.  ABSENT: diastolic murmur, rubs, systolic 

murmur


Pulses: PRESENT: normal dorsalis pedis pul


Vascular exam: PRESENT: normal capillary refill


GI/Abdominal exam: PRESENT: normal bowel sounds, soft.  ABSENT: distended, 

guarding, mass, organolmegaly, rebound, tenderness


Rectal exam: PRESENT: deferred


Gentrourinary exam: PRESENT: scrotal swelling, testicular tenderness - Left-

sided posterior tenderness, enlarged indurated 4 x 5 cm, no perineum or gluteal 

pain.  ABSENT: urethral discharge, indwelling catheter


Extremities exam: PRESENT: full ROM.  ABSENT: calf tenderness, clubbing, pedal 

edema


Neurological exam: PRESENT: alert, awake, oriented to person, oriented to place

, oriented to time, oriented to situation, CN II-XII grossly intact.  ABSENT: 

motor sensory deficit


Psychiatric exam: PRESENT: appropriate affect, normal mood.  ABSENT: homicidal 

ideation, suicidal ideation


Skin exam: PRESENT: dry, intact, warm.  ABSENT: cyanosis, rash





Results


Impressions: 


 





Scrotum Ultrasound  04/10/18 22:10


IMPRESSION:


 


1. Moderate left epididymoorchitis. Asymmetric left paracentral


scrotal wall thickening measures 1.6 cm and may indicate scrotal


cellulitis.


2. No testicular torsion or mass.


 














Assessment & Plan





- Diagnosis


(1) Orchitis


Is this a current diagnosis for this admission?: Yes   


Plan: 


Symptomatic management, empiric Rocephin, follow-up CBC, blood and urine culture

, consider urology consult.








(2) Left testicular pain


Is this a current diagnosis for this admission?: Yes   


Plan: 


Symptomatic management.  Correction #1








- Time


Time Spent: 30 to 50 Minutes





- Inpatient Certification


Medical Necessity: Need Close Monitoring Due to Risk of Patient Decompensation

## 2018-04-12 LAB
ADD MANUAL DIFF: NO
ANION GAP SERPL CALC-SCNC: 10 MMOL/L (ref 5–19)
APPEARANCE UR: CLEAR
APTT PPP: YELLOW S
BASOPHILS # BLD AUTO: 0 10^3/UL (ref 0–0.2)
BASOPHILS NFR BLD AUTO: 0.2 % (ref 0–2)
BILIRUB UR QL STRIP: NEGATIVE
BUN SERPL-MCNC: 15 MG/DL (ref 7–20)
CALCIUM: 9.4 MG/DL (ref 8.4–10.2)
CHLORIDE SERPL-SCNC: 105 MMOL/L (ref 98–107)
CO2 SERPL-SCNC: 30 MMOL/L (ref 22–30)
EOSINOPHIL # BLD AUTO: 0.5 10^3/UL (ref 0–0.6)
EOSINOPHIL NFR BLD AUTO: 3.3 % (ref 0–6)
ERYTHROCYTE [DISTWIDTH] IN BLOOD BY AUTOMATED COUNT: 13.2 % (ref 11.5–14)
GLUCOSE SERPL-MCNC: 100 MG/DL (ref 75–110)
GLUCOSE UR STRIP-MCNC: NEGATIVE MG/DL
HCT VFR BLD CALC: 39.9 % (ref 37.9–51)
HGB BLD-MCNC: 13.2 G/DL (ref 13.5–17)
KETONES UR STRIP-MCNC: NEGATIVE MG/DL
LYMPHOCYTES # BLD AUTO: 2 10^3/UL (ref 0.5–4.7)
LYMPHOCYTES NFR BLD AUTO: 12.5 % (ref 13–45)
MCH RBC QN AUTO: 27.9 PG (ref 27–33.4)
MCHC RBC AUTO-ENTMCNC: 33 G/DL (ref 32–36)
MCV RBC AUTO: 84 FL (ref 80–97)
MONOCYTES # BLD AUTO: 2.1 10^3/UL (ref 0.1–1.4)
MONOCYTES NFR BLD AUTO: 12.9 % (ref 3–13)
NEUTROPHILS # BLD AUTO: 11.4 10^3/UL (ref 1.7–8.2)
NEUTS SEG NFR BLD AUTO: 71.1 % (ref 42–78)
NITRITE UR QL STRIP: NEGATIVE
PH UR STRIP: 6 [PH] (ref 5–9)
PLATELET # BLD: 265 10^3/UL (ref 150–450)
POTASSIUM SERPL-SCNC: 4.5 MMOL/L (ref 3.6–5)
PROT UR STRIP-MCNC: NEGATIVE MG/DL
RBC # BLD AUTO: 4.73 10^6/UL (ref 4.35–5.55)
SODIUM SERPL-SCNC: 144.9 MMOL/L (ref 137–145)
SP GR UR STRIP: 1.01
TOTAL CELLS COUNTED % (AUTO): 100 %
UROBILINOGEN UR-MCNC: NEGATIVE MG/DL (ref ?–2)
WBC # BLD AUTO: 16 10^3/UL (ref 4–10.5)

## 2018-04-12 RX ADMIN — OXYCODONE HYDROCHLORIDE PRN MG: 5 TABLET ORAL at 14:46

## 2018-04-12 RX ADMIN — DOCUSATE SODIUM SCH MG: 100 CAPSULE, LIQUID FILLED ORAL at 17:26

## 2018-04-12 RX ADMIN — IBUPROFEN SCH MG: 400 TABLET ORAL at 23:00

## 2018-04-12 RX ADMIN — OXYCODONE HYDROCHLORIDE PRN MG: 5 TABLET ORAL at 09:02

## 2018-04-12 RX ADMIN — CEFTRIAXONE SODIUM SCH MG: 1 INJECTION, POWDER, FOR SOLUTION INTRAMUSCULAR; INTRAVENOUS at 09:03

## 2018-04-12 RX ADMIN — IBUPROFEN SCH MG: 400 TABLET ORAL at 08:23

## 2018-04-12 RX ADMIN — HEPARIN SODIUM SCH UNIT: 5000 INJECTION, SOLUTION INTRAVENOUS; SUBCUTANEOUS at 13:40

## 2018-04-12 RX ADMIN — DOCUSATE SODIUM SCH MG: 100 CAPSULE, LIQUID FILLED ORAL at 09:02

## 2018-04-12 RX ADMIN — ACETAMINOPHEN SCH MG: 325 TABLET ORAL at 09:02

## 2018-04-12 RX ADMIN — HEPARIN SODIUM SCH UNIT: 5000 INJECTION, SOLUTION INTRAVENOUS; SUBCUTANEOUS at 00:00

## 2018-04-12 RX ADMIN — IBUPROFEN SCH MG: 400 TABLET ORAL at 14:47

## 2018-04-12 RX ADMIN — ACETAMINOPHEN SCH MG: 325 TABLET ORAL at 16:39

## 2018-04-12 RX ADMIN — HEPARIN SODIUM SCH UNIT: 5000 INJECTION, SOLUTION INTRAVENOUS; SUBCUTANEOUS at 23:29

## 2018-04-12 NOTE — RADIOLOGY REPORT (SQ)
EXAM DESCRIPTION:  U/S RETROPERITON (RENAL/AORTA)



COMPLETED DATE/TIME:  4/12/2018 5:10 pm



REASON FOR STUDY:  abdominal pain



COMPARISON:  CT abdomen pelvis 8/28/2010



TECHNIQUE:  Dynamic and static grayscale images acquired of the kidneys and bladder and recorded on P
ACS. Additional selected color Doppler and spectral images recorded.



LIMITATIONS:  None.



FINDINGS:  RIGHT KIDNEY: 11.6 cm in length. Normal echogenicity. No solid or suspicious masses. No hy
dronephrosis.  5 to 7 mm echogenic shadowing focus right mid-pole kidney likely an intrarenal nonobst
ructive stone.

LEFT KIDNEY:  12 cm in length. Normal echogenicity. No solid or suspicious masses.  1 cm cyst left lo
wer pole kidney.  No hydronephrosis. No calcifications.

BLADDER: Decompressed not well seen

OTHER FINDINGS: No other significant finding.



IMPRESSION:  No right or left hydronephrosis

5 to 7 mm right midpole intrarenal nonobstructive calculus

Bladder not visualized



TECHNICAL DOCUMENTATION:  JOB ID:  [id number]

 2011 [employer]- All Rights Reserved



Reading location - IP/workstation name: [facility]

## 2018-04-12 NOTE — PDOC PROGRESS REPORT
Subjective


Progress Note for:: 04/12/18


Subjective:: 





Admitted 4/11 for epididymitis.  Complains of pain in scrotum, though 

improving.  Swelling also improving.  No fever or chills, no chest pain or 

shortness of breath or palpitations.  Patient is being followed by urologist 

Leoncio today and he stated no abscess, and no surgical intervention needed at 

this time.  Recommend continue Rocephin, around-the-clock ibuprofen, elevate 

the scrotum.  Also has recommended rechecking scrotal ultrasound prior to 

discharge, and checking kidney ultrasound today.


Reason For Visit: 


EPIDIDYMITMS








Physical Exam


Vital Signs: 


 











Temp Pulse Resp BP Pulse Ox


 


 98.2 F   96   17   124/79   97 


 


 04/12/18 15:07  04/12/18 15:07  04/12/18 15:07  04/12/18 15:07  04/12/18 15:07








 Intake & Output











 04/11/18 04/12/18 04/13/18





 06:59 06:59 06:59


 


Intake Total  965 350


 


Output Total  1200 


 


Balance  -235 350


 


Weight 104.2 kg 107.1 kg 














GENERAL: Well-developed, no acute distress


HEENT: Normocephalic/atraumatic


NECK supple, no JVD


CARDIOVASCULAR: RRR, normal S1-S2


LUNGS: CTA bilaterally


ABDOMEN: Soft, NT, NL bowel sounds


EXTREMITIES: No edema, clubbing, cyanosis


: Significant edema of scrotum with some redness, tenderness


NEUROLOGICAL: Alert, oriented x 3, nonfocal








Results


Laboratory Results: 


 





 04/12/18 06:25 





 04/12/18 06:25 





 











  04/12/18 04/12/18 04/12/18





  06:25 06:25 13:00


 


WBC  16.0 H  


 


RBC  4.73  


 


Hgb  13.2 L  


 


Hct  39.9  


 


MCV  84  


 


MCH  27.9  


 


MCHC  33.0  


 


RDW  13.2  


 


Plt Count  265  


 


Seg Neutrophils %  71.1  


 


Lymphocytes %  12.5 L  


 


Monocytes %  12.9  


 


Eosinophils %  3.3  


 


Basophils %  0.2  


 


Absolute Neutrophils  11.4 H  


 


Absolute Lymphocytes  2.0  


 


Absolute Monocytes  2.1 H  


 


Absolute Eosinophils  0.5  


 


Absolute Basophils  0.0  


 


Sodium   144.9 


 


Potassium   4.5 


 


Chloride   105 


 


Carbon Dioxide   30 


 


Anion Gap   10 


 


BUN   15 


 


Creatinine   0.89 


 


Est GFR ( Amer)   > 60 


 


Est GFR (Non-Af Amer)   > 60 


 


Glucose   100 


 


Calcium   9.4 


 


Urine Color    YELLOW


 


Urine Appearance    CLEAR


 


Urine pH    6.0


 


Ur Specific Gravity    1.009


 


Urine Protein    NEGATIVE


 


Urine Glucose (UA)    NEGATIVE


 


Urine Ketones    NEGATIVE


 


Urine Blood    NEGATIVE


 


Urine Nitrite    NEGATIVE


 


Ur Leukocyte Esterase    NEGATIVE


 


Urine WBC (Auto)    8


 


Urine RBC (Auto)    0











Impressions: 


 





Scrotum Ultrasound  04/10/18 22:10


IMPRESSION:


 


1. Moderate left epididymoorchitis. Asymmetric left paracentral


scrotal wall thickening measures 1.6 cm and may indicate scrotal


cellulitis.


2. No testicular torsion or mass.


 














Assessment & Plan





- Plan Summary


Plan Summary: 





(1) Orchitis


Is this a current diagnosis for this admission?: Yes   


Plan: 


Continue symptomatic management, empiric Rocephin, white blood cell slightly 

done today although still elevated, follow-up CBC, blood and urine culture, 

urology consult appreciated.


-Urology follow-up appreciated.  Recommending scrotal ultrasound prior to 

discharge and kidney ultrasound today.





(2) Left testicular pain


Is this a current diagnosis for this admission?: Yes   


Plan: 


Symptomatic management.  Elevate scrotum, around-the-clock ibuprofen per 

urology recommendation.

## 2018-04-13 LAB
ADD MANUAL DIFF: NO
ANION GAP SERPL CALC-SCNC: 11 MMOL/L (ref 5–19)
BASOPHILS # BLD AUTO: 0 10^3/UL (ref 0–0.2)
BASOPHILS NFR BLD AUTO: 0.4 % (ref 0–2)
BUN SERPL-MCNC: 15 MG/DL (ref 7–20)
CALCIUM: 9.3 MG/DL (ref 8.4–10.2)
CHLORIDE SERPL-SCNC: 108 MMOL/L (ref 98–107)
CO2 SERPL-SCNC: 26 MMOL/L (ref 22–30)
EOSINOPHIL # BLD AUTO: 0.7 10^3/UL (ref 0–0.6)
EOSINOPHIL NFR BLD AUTO: 5.4 % (ref 0–6)
ERYTHROCYTE [DISTWIDTH] IN BLOOD BY AUTOMATED COUNT: 13.1 % (ref 11.5–14)
GLUCOSE SERPL-MCNC: 89 MG/DL (ref 75–110)
HCT VFR BLD CALC: 40.1 % (ref 37.9–51)
HGB BLD-MCNC: 13.3 G/DL (ref 13.5–17)
LYMPHOCYTES # BLD AUTO: 2.5 10^3/UL (ref 0.5–4.7)
LYMPHOCYTES NFR BLD AUTO: 18.5 % (ref 13–45)
MCH RBC QN AUTO: 27.8 PG (ref 27–33.4)
MCHC RBC AUTO-ENTMCNC: 33.2 G/DL (ref 32–36)
MCV RBC AUTO: 84 FL (ref 80–97)
MONOCYTES # BLD AUTO: 1.4 10^3/UL (ref 0.1–1.4)
MONOCYTES NFR BLD AUTO: 10.2 % (ref 3–13)
NEUTROPHILS # BLD AUTO: 8.8 10^3/UL (ref 1.7–8.2)
NEUTS SEG NFR BLD AUTO: 65.5 % (ref 42–78)
PLATELET # BLD: 288 10^3/UL (ref 150–450)
POTASSIUM SERPL-SCNC: 4.6 MMOL/L (ref 3.6–5)
RBC # BLD AUTO: 4.8 10^6/UL (ref 4.35–5.55)
SODIUM SERPL-SCNC: 144.9 MMOL/L (ref 137–145)
TOTAL CELLS COUNTED % (AUTO): 100 %
WBC # BLD AUTO: 13.4 10^3/UL (ref 4–10.5)

## 2018-04-13 RX ADMIN — OXYCODONE HYDROCHLORIDE PRN MG: 5 TABLET ORAL at 09:41

## 2018-04-13 RX ADMIN — CEFTRIAXONE SODIUM SCH MG: 1 INJECTION, POWDER, FOR SOLUTION INTRAMUSCULAR; INTRAVENOUS at 09:34

## 2018-04-13 RX ADMIN — PROPRANOLOL HYDROCHLORIDE SCH MG: 40 TABLET ORAL at 22:32

## 2018-04-13 RX ADMIN — IBUPROFEN SCH MG: 400 TABLET ORAL at 22:31

## 2018-04-13 RX ADMIN — OXYCODONE HYDROCHLORIDE PRN MG: 5 TABLET ORAL at 16:12

## 2018-04-13 RX ADMIN — HEPARIN SODIUM SCH UNIT: 5000 INJECTION, SOLUTION INTRAVENOUS; SUBCUTANEOUS at 14:46

## 2018-04-13 RX ADMIN — IBUPROFEN SCH MG: 400 TABLET ORAL at 06:55

## 2018-04-13 RX ADMIN — PROPRANOLOL HYDROCHLORIDE SCH MG: 40 TABLET ORAL at 14:43

## 2018-04-13 RX ADMIN — IBUPROFEN SCH MG: 400 TABLET ORAL at 14:46

## 2018-04-13 RX ADMIN — ACETAMINOPHEN SCH MG: 325 TABLET ORAL at 16:12

## 2018-04-13 RX ADMIN — PROPRANOLOL HYDROCHLORIDE SCH MG: 40 TABLET ORAL at 09:27

## 2018-04-13 RX ADMIN — DOCUSATE SODIUM SCH MG: 100 CAPSULE, LIQUID FILLED ORAL at 09:34

## 2018-04-13 RX ADMIN — HEPARIN SODIUM SCH UNIT: 5000 INJECTION, SOLUTION INTRAVENOUS; SUBCUTANEOUS at 05:00

## 2018-04-13 RX ADMIN — ACETAMINOPHEN SCH MG: 325 TABLET ORAL at 00:15

## 2018-04-13 RX ADMIN — ACETAMINOPHEN SCH MG: 325 TABLET ORAL at 09:31

## 2018-04-13 RX ADMIN — DOCUSATE SODIUM SCH MG: 100 CAPSULE, LIQUID FILLED ORAL at 17:17

## 2018-04-13 NOTE — PDOC PROGRESS REPORT
Subjective


Progress Note for:: 04/13/18


Subjective:: 





Admitted 4/11 for epididymitis.  Complains of pain in scrotum still, though 

continues to improve and swelling much better.  No fever or chills, no chest 

pain or shortness of breath or palpitations.  Patient is being followed by 

urologist Leoncio and he stated no abscess, and no surgical intervention needed at 

this time.  Recommend continue Rocephin, around-the-clock ibuprofen, elevate 

the scrotum.  Also has recommended rechecking scrotal ultrasound prior to 

discharge, and checking kidney ultrasound 4/12 --result of.  





Today the urologist recommended stopping Rocephin after today's dose and 

transitioning to p.o. antibiotics by tomorrow watching overnight after starting 

p.o. antibiotics to ensure no fevers and a white count remaining improved.


Reason For Visit: 


EPIDIDYMITMS








Physical Exam


Vital Signs: 


 











Temp Pulse Resp BP Pulse Ox


 


 98.2 F   71   17   122/74   97 


 


 04/13/18 11:43  04/13/18 11:43  04/13/18 11:43  04/13/18 11:43  04/13/18 11:43








 Intake & Output











 04/12/18 04/13/18 04/14/18





 06:59 06:59 06:59


 


Intake Total 965 350 


 


Output Total 1200 300 


 


Balance -235 50 


 


Weight 107.1 kg 101.8 kg 101.8 kg











GENERAL: Well-developed, no acute distress


HEENT: Normocephalic/atraumatic


NECK supple, no JVD


CARDIOVASCULAR: RRR, normal S1-S2


LUNGS: CTA bilaterally


ABDOMEN: Soft, NT, NL bowel sounds


EXTREMITIES: No edema, clubbing, cyanosis


: Edema of scrotum but improved, also improvement in tenderness


NEUROLOGICAL: Alert, oriented x 3, nonfocal








Results


Laboratory Results: 


 





 04/13/18 06:22 





 04/13/18 06:22 





 











  04/13/18 04/13/18





  06:22 06:22


 


WBC  13.4 H 


 


RBC  4.80 


 


Hgb  13.3 L 


 


Hct  40.1 


 


MCV  84 


 


MCH  27.8 


 


MCHC  33.2 


 


RDW  13.1 


 


Plt Count  288 


 


Seg Neutrophils %  65.5 


 


Lymphocytes %  18.5 


 


Monocytes %  10.2 


 


Eosinophils %  5.4 


 


Basophils %  0.4 


 


Absolute Neutrophils  8.8 H 


 


Absolute Lymphocytes  2.5 


 


Absolute Monocytes  1.4 


 


Absolute Eosinophils  0.7 H 


 


Absolute Basophils  0.0 


 


Sodium   144.9


 


Potassium   4.6


 


Chloride   108 H


 


Carbon Dioxide   26


 


Anion Gap   11


 


BUN   15


 


Creatinine   0.78


 


Est GFR ( Amer)   > 60


 


Est GFR (Non-Af Amer)   > 60


 


Glucose   89


 


Calcium   9.3











Impressions: 


 





Scrotum Ultrasound  04/10/18 22:10


IMPRESSION:


 


1. Moderate left epididymoorchitis. Asymmetric left paracentral


scrotal wall thickening measures 1.6 cm and may indicate scrotal


cellulitis.


2. No testicular torsion or mass.


 








Renal Ultrasound  04/12/18 00:00


IMPRESSION:  No right or left hydronephrosis


5 to 7 mm right midpole intrarenal nonobstructive calculus


Bladder not visualized


 








Assessment & Plan





- Plan Summary


Plan Summary: 





(1) Orchitis


Is this a current diagnosis for this admission?: Yes   


Plan: 


Continue symptomatic management, empiric Rocephin, white blood cell slightly 

done today although still elevated, follow-up CBC, blood and urine culture, 

urology consult appreciated.


-Urology follow-up appreciated.  Recommending scrotal ultrasound prior to 

discharge. Kidney ultrasound 4/12 as an imaging above.


-Urology recommended stopping IV antibiotics after today's dose and starting 

p.o. antibiotics tomorrow.  May recheck scrotal ultrasound after 24 hours of 

p.o. antibiotics if no fever or increase in white count, and if no abscess 

repeat scrotal ultrasound, DC home.





(2) Left testicular pain


Is this a current diagnosis for this admission?: Yes   


Plan: 


Symptomatic management.  Elevate scrotum, ibuprofen per urology recommendation.

## 2018-04-14 LAB
ADD MANUAL DIFF: NO
ANION GAP SERPL CALC-SCNC: 12 MMOL/L (ref 5–19)
BASOPHILS # BLD AUTO: 0.1 10^3/UL (ref 0–0.2)
BASOPHILS NFR BLD AUTO: 0.7 % (ref 0–2)
BUN SERPL-MCNC: 21 MG/DL (ref 7–20)
CALCIUM: 9.3 MG/DL (ref 8.4–10.2)
CHLORIDE SERPL-SCNC: 106 MMOL/L (ref 98–107)
CO2 SERPL-SCNC: 26 MMOL/L (ref 22–30)
EOSINOPHIL # BLD AUTO: 1 10^3/UL (ref 0–0.6)
EOSINOPHIL NFR BLD AUTO: 9.1 % (ref 0–6)
ERYTHROCYTE [DISTWIDTH] IN BLOOD BY AUTOMATED COUNT: 13.1 % (ref 11.5–14)
GLUCOSE SERPL-MCNC: 80 MG/DL (ref 75–110)
HCT VFR BLD CALC: 39.1 % (ref 37.9–51)
HGB BLD-MCNC: 13 G/DL (ref 13.5–17)
LYMPHOCYTES # BLD AUTO: 2.8 10^3/UL (ref 0.5–4.7)
LYMPHOCYTES NFR BLD AUTO: 25.6 % (ref 13–45)
MCH RBC QN AUTO: 28 PG (ref 27–33.4)
MCHC RBC AUTO-ENTMCNC: 33.2 G/DL (ref 32–36)
MCV RBC AUTO: 84 FL (ref 80–97)
MONOCYTES # BLD AUTO: 1.2 10^3/UL (ref 0.1–1.4)
MONOCYTES NFR BLD AUTO: 11.2 % (ref 3–13)
NEUTROPHILS # BLD AUTO: 5.8 10^3/UL (ref 1.7–8.2)
NEUTS SEG NFR BLD AUTO: 53.4 % (ref 42–78)
PLATELET # BLD: 311 10^3/UL (ref 150–450)
POTASSIUM SERPL-SCNC: 4.6 MMOL/L (ref 3.6–5)
RBC # BLD AUTO: 4.64 10^6/UL (ref 4.35–5.55)
SODIUM SERPL-SCNC: 144 MMOL/L (ref 137–145)
TOTAL CELLS COUNTED % (AUTO): 100 %
WBC # BLD AUTO: 10.9 10^3/UL (ref 4–10.5)

## 2018-04-14 RX ADMIN — PROPRANOLOL HYDROCHLORIDE SCH MG: 40 TABLET ORAL at 14:13

## 2018-04-14 RX ADMIN — HEPARIN SODIUM SCH UNIT: 5000 INJECTION, SOLUTION INTRAVENOUS; SUBCUTANEOUS at 14:13

## 2018-04-14 RX ADMIN — ACETAMINOPHEN SCH MG: 325 TABLET ORAL at 18:19

## 2018-04-14 RX ADMIN — HEPARIN SODIUM SCH UNIT: 5000 INJECTION, SOLUTION INTRAVENOUS; SUBCUTANEOUS at 21:55

## 2018-04-14 RX ADMIN — IBUPROFEN SCH MG: 400 TABLET ORAL at 06:18

## 2018-04-14 RX ADMIN — PROPRANOLOL HYDROCHLORIDE SCH MG: 40 TABLET ORAL at 21:58

## 2018-04-14 RX ADMIN — DOXYCYCLINE HYCLATE SCH MG: 100 TABLET ORAL at 12:11

## 2018-04-14 RX ADMIN — ACETAMINOPHEN SCH MG: 325 TABLET ORAL at 12:13

## 2018-04-14 RX ADMIN — DOCUSATE SODIUM SCH MG: 100 CAPSULE, LIQUID FILLED ORAL at 18:20

## 2018-04-14 RX ADMIN — HEPARIN SODIUM SCH UNIT: 5000 INJECTION, SOLUTION INTRAVENOUS; SUBCUTANEOUS at 00:40

## 2018-04-14 RX ADMIN — DOCUSATE SODIUM SCH MG: 100 CAPSULE, LIQUID FILLED ORAL at 12:12

## 2018-04-14 RX ADMIN — HEPARIN SODIUM SCH UNIT: 5000 INJECTION, SOLUTION INTRAVENOUS; SUBCUTANEOUS at 05:19

## 2018-04-14 RX ADMIN — OXYCODONE HYDROCHLORIDE PRN MG: 5 TABLET ORAL at 18:18

## 2018-04-14 RX ADMIN — ACETAMINOPHEN SCH MG: 325 TABLET ORAL at 00:38

## 2018-04-14 RX ADMIN — PROPRANOLOL HYDROCHLORIDE SCH MG: 40 TABLET ORAL at 12:14

## 2018-04-14 RX ADMIN — DOXYCYCLINE HYCLATE SCH MG: 100 TABLET ORAL at 21:58

## 2018-04-14 NOTE — PDOC PROGRESS REPORT
Subjective


Progress Note for:: 04/14/18


Subjective:: 





Admitted 4/11 with scrotal swelling and pain.  Today reports pain in scrotum 

continues to improve as well as swelling.  No fever or chills, no chest pain or 

shortness of breath or palpitations.  Patient was followed by urologist Leoncio 

and he stated no abscess, and stated no surgical intervention needed at this 

time.  Recommended continue Rocephin, ibuprofen, elevating the scrotum.  Also 

has recommended rechecking scrotal ultrasound prior to discharge, and checking 

kidney ultrasound--done 4/12 --result as in imaging below.  





The urologist further recommended stopping Rocephin after yesterday (4/13) dose 

and transitioning to p.o. antibiotics by today (4/14), watching overnight after 

starting the p.o. antibiotics to ensure no fevers and that white count is 

remaining improved.  If he remains afebrile and white count remains improved, 

plan is to re-check scrotal ultrasound tomorrow, and if no abscess patient may 

be discharged home.


Reason For Visit: 


EPIDIDYMITMS








Physical Exam


Vital Signs: 


 











Temp Pulse Resp BP Pulse Ox


 


 97.6 F   64   16   115/71   98 


 


 04/14/18 07:42  04/14/18 07:42  04/14/18 07:42  04/14/18 07:42  04/14/18 07:42








 Intake & Output











 04/13/18 04/14/18 04/15/18





 06:59 06:59 06:59


 


Intake Total 350 1600 


 


Output Total 300 2500 


 


Balance 50 -900 


 


Weight 101.8 kg 102.7 kg 








GENERAL: Well-developed, no acute distress


NECK supple, no JVD


CARDIOVASCULAR: RRR, normal S1-S2


LUNGS: CTA bilaterally


ABDOMEN: Soft, NT, NL bowel sounds


EXTREMITIES: No edema, clubbing, cyanosis


: Edema of scrotum still but greatly improved, also improvement in tenderness


NEUROLOGICAL: Alert, oriented x 3, nonfocal








Results


Laboratory Results: 


 





 04/14/18 06:01 





 04/14/18 06:01 





 











  04/14/18 04/14/18





  06:01 06:01


 


WBC  10.9 H 


 


RBC  4.64 


 


Hgb  13.0 L 


 


Hct  39.1 


 


MCV  84 


 


MCH  28.0 


 


MCHC  33.2 


 


RDW  13.1 


 


Plt Count  311 


 


Seg Neutrophils %  53.4 


 


Lymphocytes %  25.6 


 


Monocytes %  11.2 


 


Eosinophils %  9.1 H 


 


Basophils %  0.7 


 


Absolute Neutrophils  5.8 


 


Absolute Lymphocytes  2.8 


 


Absolute Monocytes  1.2 


 


Absolute Eosinophils  1.0 H 


 


Absolute Basophils  0.1 


 


Sodium   144.0


 


Potassium   4.6


 


Chloride   106


 


Carbon Dioxide   26


 


Anion Gap   12


 


BUN   21 H


 


Creatinine   0.84


 


Est GFR ( Amer)   > 60


 


Est GFR (Non-Af Amer)   > 60


 


Glucose   80


 


Calcium   9.3











Impressions: 


 





Scrotum Ultrasound  04/10/18 22:10


IMPRESSION:


 


1. Moderate left epididymoorchitis. Asymmetric left paracentral


scrotal wall thickening measures 1.6 cm and may indicate scrotal


cellulitis.


2. No testicular torsion or mass.


 








Renal Ultrasound  04/12/18 00:00


IMPRESSION:  No right or left hydronephrosis


5 to 7 mm right midpole intrarenal nonobstructive calculus


Bladder not visualized


 














Assessment & Plan





- Plan Summary


Plan Summary: 





(1) Orchitis


Is this a current diagnosis for this admission?: Yes   


Plan: 


Continue symptomatic management, empiric Rocephin, white blood cell continues 

to be improved, follow-up CBC, blood and urine culture, urology consult 

appreciated.


-Urology recommending scrotal ultrasound prior to discharge. Kidney ultrasound 4 /12 as an imaging above.


-Urology recommended stopping IV antibiotics after yesterday's dose and 

starting p.o. antibiotics today.


-Doxycycline 100 mg twice daily started.  Recommend close follow-up 10 days of 

these antibiotics. 


-Urology recommended recheck scrotal ultrasound after 24 hours of p.o. 

antibiotics, if no fever or increase in white count, and if no abscess on 

repeat scrotal ultrasound, to [place] home and f/u with him (Dr. Fang) in clinic in 

2 weeks.





(2) Left testicular pain


Is this a current diagnosis for this admission?: Yes   


Plan: 


Symptomatic management.  Elevate scrotum, ibuprofen per urology recommendation.

## 2018-04-15 LAB
ADD MANUAL DIFF: NO
ANION GAP SERPL CALC-SCNC: 11 MMOL/L (ref 5–19)
BASOPHILS # BLD AUTO: 0.1 10^3/UL (ref 0–0.2)
BASOPHILS NFR BLD AUTO: 0.6 % (ref 0–2)
BUN SERPL-MCNC: 17 MG/DL (ref 7–20)
CALCIUM: 9.7 MG/DL (ref 8.4–10.2)
CHLORIDE SERPL-SCNC: 105 MMOL/L (ref 98–107)
CO2 SERPL-SCNC: 26 MMOL/L (ref 22–30)
EOSINOPHIL # BLD AUTO: 1 10^3/UL (ref 0–0.6)
EOSINOPHIL NFR BLD AUTO: 8.4 % (ref 0–6)
ERYTHROCYTE [DISTWIDTH] IN BLOOD BY AUTOMATED COUNT: 13.4 % (ref 11.5–14)
GLUCOSE SERPL-MCNC: 89 MG/DL (ref 75–110)
HCT VFR BLD CALC: 41.4 % (ref 37.9–51)
HGB BLD-MCNC: 13.7 G/DL (ref 13.5–17)
LYMPHOCYTES # BLD AUTO: 3.1 10^3/UL (ref 0.5–4.7)
LYMPHOCYTES NFR BLD AUTO: 26.1 % (ref 13–45)
MCH RBC QN AUTO: 27.7 PG (ref 27–33.4)
MCHC RBC AUTO-ENTMCNC: 33.1 G/DL (ref 32–36)
MCV RBC AUTO: 84 FL (ref 80–97)
MONOCYTES # BLD AUTO: 1.2 10^3/UL (ref 0.1–1.4)
MONOCYTES NFR BLD AUTO: 10.3 % (ref 3–13)
NEUTROPHILS # BLD AUTO: 6.5 10^3/UL (ref 1.7–8.2)
NEUTS SEG NFR BLD AUTO: 54.6 % (ref 42–78)
PLATELET # BLD: 376 10^3/UL (ref 150–450)
POTASSIUM SERPL-SCNC: 4.8 MMOL/L (ref 3.6–5)
RBC # BLD AUTO: 4.94 10^6/UL (ref 4.35–5.55)
SODIUM SERPL-SCNC: 141.9 MMOL/L (ref 137–145)
TOTAL CELLS COUNTED % (AUTO): 100 %
WBC # BLD AUTO: 11.9 10^3/UL (ref 4–10.5)

## 2018-04-15 RX ADMIN — ACETAMINOPHEN SCH MG: 325 TABLET ORAL at 16:25

## 2018-04-15 RX ADMIN — ACETAMINOPHEN SCH MG: 325 TABLET ORAL at 01:14

## 2018-04-15 RX ADMIN — PROPRANOLOL HYDROCHLORIDE SCH MG: 40 TABLET ORAL at 06:14

## 2018-04-15 RX ADMIN — PROPRANOLOL HYDROCHLORIDE SCH MG: 40 TABLET ORAL at 13:09

## 2018-04-15 RX ADMIN — HEPARIN SODIUM SCH UNIT: 5000 INJECTION, SOLUTION INTRAVENOUS; SUBCUTANEOUS at 13:09

## 2018-04-15 RX ADMIN — ACETAMINOPHEN SCH MG: 325 TABLET ORAL at 08:14

## 2018-04-15 RX ADMIN — DOXYCYCLINE HYCLATE SCH MG: 100 TABLET ORAL at 09:18

## 2018-04-15 RX ADMIN — HEPARIN SODIUM SCH UNIT: 5000 INJECTION, SOLUTION INTRAVENOUS; SUBCUTANEOUS at 06:13

## 2018-04-15 RX ADMIN — MEROPENEM SCH GM: 1 INJECTION INTRAVENOUS at 18:13

## 2018-04-15 RX ADMIN — OXYCODONE HYDROCHLORIDE PRN MG: 5 TABLET ORAL at 07:16

## 2018-04-15 RX ADMIN — DOCUSATE SODIUM SCH MG: 100 CAPSULE, LIQUID FILLED ORAL at 09:18

## 2018-04-15 RX ADMIN — PROPRANOLOL HYDROCHLORIDE SCH MG: 40 TABLET ORAL at 21:52

## 2018-04-15 RX ADMIN — HEPARIN SODIUM SCH UNIT: 5000 INJECTION, SOLUTION INTRAVENOUS; SUBCUTANEOUS at 21:52

## 2018-04-15 RX ADMIN — DOCUSATE SODIUM SCH MG: 100 CAPSULE, LIQUID FILLED ORAL at 17:07

## 2018-04-15 RX ADMIN — DOXYCYCLINE HYCLATE SCH MG: 100 TABLET ORAL at 21:52

## 2018-04-15 RX ADMIN — OXYCODONE HYDROCHLORIDE PRN MG: 5 TABLET ORAL at 20:17

## 2018-04-15 NOTE — RADIOLOGY REPORT (SQ)
EXAM DESCRIPTION:  U/S SCROTUM W/DOPPLER



COMPLETED DATE/TIME:  4/15/2018 8:53 am



REASON FOR STUDY:  testicular pain



COMPARISON:  4/10/2018



TECHNIQUE:  Static and realtime gray scale imaging of the scrotum and testes.  Selected color Doppler
 and spectral images recorded to document blood flow.



LIMITATIONS:  None.



FINDINGS:  RIGHT:

TESTICLE: Normal size. Normal echotexture.  Normal blood flow.  No mass.

EPIDIDYMIS: Normal.

HYDROCELE OR VARICOCELE: Small hydrocele.  No varicocele.

HERNIA OR EXTRA-TESTICULAR MASS: No.

OTHER: Scrotal wall thickening.

LEFT:

TESTICLE: Normal size. Normal echotexture.  Normal blood flow.  No mass.

EPIDIDYMIS: Enlarged heterogeneous in echotexture with increased color flow.

HYDROCELE OR VARICOCELE: Complex hydrocele.  No varicocele.

HERNIA OR EXTRA-TESTICULAR MASS: No.

OTHER: Scrotal wall thickening.



IMPRESSION:  NORMAL TESTES WITHOUT MASS OR TORSION.

SONOGRAPHIC FINDINGS AGAIN CONSISTENT WITH LEFT-SIDED EPIDIDYMITIS WITH COMPLEX LEFT HYDROCELE AND SM
ALL RIGHT HYDROCELE.



TECHNICAL DOCUMENTATION:  JOB ID:  [id number]

 [email or web address]- All Rights Reserved



Reading location - IP/workstation name: [email or web address]

## 2018-04-15 NOTE — RADIOLOGY REPORT (SQ)
EXAM DESCRIPTION:  CT PELVIS WITH



COMPLETED DATE/TIME:  4/15/2018 9:01 am



REASON FOR STUDY:  orchitis  include scrotum with IV contrast



COMPARISON:  None.



TECHNIQUE:  CT scan of the pelvis performed WITH IV CONTRAST.  Images reviewed with soft tissue and b
one windows.  Reconstructed coronal and sagittal MPR images reviewed.  All images stored on PACS.

All CT scanners at this facility use dose modulation, iterative reconstruction, and/or weight based d
osing when appropriate to reduce radiation dose to as low as reasonably achievable (ALARA).

[facility]: Dose Right  [facility]: CareDose    [facility]: Dose Right    [facility]: Teradose 4D    [facility]: Smart Punch Bowl Social



RADIATION DOSE:  CT Rad equipment meets quality standard of care and radiation dose reduction techniq
ues were employed. CTDIvol: 18.8 - 20.8 mGy. DLP: 1967 mGy-cm. mGy.



LIMITATIONS:  None.



FINDINGS:  PELVIC BONES: No acute fracture. No worrisome bone lesions.

VISUALIZED SPINE: No acute findings.

HIP(S): No acute fracture or dislocation. No worrisome bone lesions.

PELVIC SOFT TISSUES: Bilateral scrotal hydroceles.  Otherwise unremarkable.

EXTRAPELVIC SOFT TISSUES: Scrotal wall edema.  Otherwise normal.

OTHER: No other significant finding.



IMPRESSION:  BILATERAL SCROTAL HYDROCELES AND SCROTAL WALL EDEMA.  OTHERWISE UNREMARKABLE CONTRAST-EN
HANCED CT OF THE PELVIS.



TECHNICAL DOCUMENTATION:  JOB ID:  [id number]

Quality ID # 436: Final reports with documentation of one or more dose reduction techniques (e.g., Au
tomated exposure control, adjustment of the mA and/or kV according to patient size, use of iterative 
reconstruction technique)

 2011 [employer]- All Rights Reserved



Reading location - IP/workstation name: [email or web address]

## 2018-04-16 RX ADMIN — MEROPENEM SCH GM: 1 INJECTION INTRAVENOUS at 09:32

## 2018-04-16 RX ADMIN — MEROPENEM SCH GM: 1 INJECTION INTRAVENOUS at 18:37

## 2018-04-16 RX ADMIN — ACETAMINOPHEN SCH MG: 325 TABLET ORAL at 08:35

## 2018-04-16 RX ADMIN — DOXYCYCLINE HYCLATE SCH MG: 100 TABLET ORAL at 09:31

## 2018-04-16 RX ADMIN — DOCUSATE SODIUM SCH MG: 100 CAPSULE, LIQUID FILLED ORAL at 17:45

## 2018-04-16 RX ADMIN — PROPRANOLOL HYDROCHLORIDE SCH MG: 40 TABLET ORAL at 06:20

## 2018-04-16 RX ADMIN — PROPRANOLOL HYDROCHLORIDE SCH MG: 40 TABLET ORAL at 14:48

## 2018-04-16 RX ADMIN — DOXYCYCLINE HYCLATE SCH MG: 100 TABLET ORAL at 21:53

## 2018-04-16 RX ADMIN — ACETAMINOPHEN SCH MG: 325 TABLET ORAL at 02:39

## 2018-04-16 RX ADMIN — HEPARIN SODIUM SCH UNIT: 5000 INJECTION, SOLUTION INTRAVENOUS; SUBCUTANEOUS at 06:20

## 2018-04-16 RX ADMIN — PROPRANOLOL HYDROCHLORIDE SCH MG: 40 TABLET ORAL at 21:53

## 2018-04-16 RX ADMIN — HEPARIN SODIUM SCH UNIT: 5000 INJECTION, SOLUTION INTRAVENOUS; SUBCUTANEOUS at 21:56

## 2018-04-16 RX ADMIN — DOCUSATE SODIUM SCH MG: 100 CAPSULE, LIQUID FILLED ORAL at 09:48

## 2018-04-16 RX ADMIN — MEROPENEM SCH GM: 1 INJECTION INTRAVENOUS at 02:00

## 2018-04-16 RX ADMIN — ACETAMINOPHEN SCH MG: 325 TABLET ORAL at 17:27

## 2018-04-16 RX ADMIN — HEPARIN SODIUM SCH UNIT: 5000 INJECTION, SOLUTION INTRAVENOUS; SUBCUTANEOUS at 14:51

## 2018-04-16 NOTE — PDOC PROGRESS REPORT
Subjective


Progress Note for:: 04/16/18


Subjective:: 





Patient states he is feeling a little better


Pain is less ; decreased redness of the left scrotum


No fever no chills


Reason For Visit: 


EPIDIDYMITMS








Physical Exam


Vital Signs: 


 











Temp Pulse Resp BP Pulse Ox


 


 98.7 F   84   17   119/79   93 


 


 04/15/18 23:56  04/15/18 23:56  04/15/18 23:56  04/15/18 23:56  04/15/18 23:56








 Intake & Output











 04/15/18 04/16/18 04/17/18





 00:59 00:59 00:59


 


Intake Total 1000 960 975


 


Output Total 2050 1950 350


 


Balance -1050 -990 625


 


Weight 102.7 kg  











General appearance: PRESENT: no acute distress, cooperative


Head exam: PRESENT: atraumatic, normocephalic


Eye exam: PRESENT: conjunctiva pink, EOMI, PERRLA.  ABSENT: scleral icterus


Neck exam: ABSENT: carotid bruit, JVD, lymphadenopathy, thyromegaly


Respiratory exam: PRESENT: clear to auscultation diamond.  ABSENT: rales, rhonchi, 

wheezes


Cardiovascular exam: PRESENT: RRR.  ABSENT: diastolic murmur, rubs, systolic 

murmur


GI/Abdominal exam: PRESENT: normal bowel sounds, soft.  ABSENT: distended, 

guarding, mass, organolmegaly, rebound, tenderness


Gentrourinary exam: PRESENT: other - Left scrotum still somewhat swollen There 

is decreased redness and increased warmth of the skin to touch and decreased 

tenderness The testis is still swollen firm tender on palpation





Results


Laboratory Results: 


 





 04/15/18 06:51 





 04/15/18 06:51 





 











  04/15/18





  06:51


 


C-Reactive Protein  42.7 H








 





04/12/18 11:45   Penis   Gram Stain - Final


04/12/18 11:45   Penis   Urethral Culture - Final


                            Skin Adriana


                            No Neisseria Gonorrhoeae








Impressions: 


 





Renal Ultrasound  04/12/18 00:00


IMPRESSION:  No right or left hydronephrosis


5 to 7 mm right midpole intrarenal nonobstructive calculus


Bladder not visualized


 








Scrotum Ultrasound  04/15/18 07:00


IMPRESSION:  NORMAL TESTES WITHOUT MASS OR TORSION.


SONOGRAPHIC FINDINGS AGAIN CONSISTENT WITH LEFT-SIDED EPIDIDYMITIS WITH COMPLEX 

LEFT HYDROCELE AND SMALL RIGHT HYDROCELE.


 








Pelvis CT  04/15/18 07:52


IMPRESSION:  BILATERAL SCROTAL HYDROCELES AND SCROTAL WALL EDEMA.  OTHERWISE 

UNREMARKABLE CONTRAST-ENHANCED CT OF THE PELVIS.


 














Assessment & Plan





- Diagnosis


(1) Epididymoorchitis


Is this a current diagnosis for this admission?: Yes   





(2) Cellulitis of scrotum


Is this a current diagnosis for this admission?: Yes   





- Time


Time Spent with patient: 





Treatment was initiated yesterday with meropenem to cover for gram-negative 


patient is allergic to Levaquin


We will continue meropenem and doxycycline until almost complete resolution of 

the epididymitis


We would expect the patient to remain in the hospital for another 48-72 hours 

until he is clinically improved


Will follow CBC and CRP is





Time Spent with patient: 25-34 minutes





- Inpatient Certification


Based on my medical assessment, after consideration of the patient's 

comorbidities, presenting symptoms, or acuity I expect that the services needed 

warrant INPATIENT care.: Yes


I certify that my determination is in accordance with my understanding of 

Medicare's requirements for reasonable and necessary INPATIENT services [42 CFR 

412.3e].: Yes


Medical Necessity: Need for IV Antibiotics

## 2018-04-17 LAB
ABSOLUTE LYMPHOCYTES# (MANUAL): 3.5 10^3/UL (ref 0.5–4.7)
ABSOLUTE MONOCYTES # (MANUAL): 0.8 10^3/UL (ref 0.1–1.4)
ABSOLUTE NEUTROPHILS# (MANUAL): 7.4 10^3/UL (ref 1.7–8.2)
ADD MANUAL DIFF: YES
BASOPHILS NFR BLD MANUAL: 3 % (ref 0–2)
EOSINOPHIL NFR BLD MANUAL: 6 % (ref 0–6)
ERYTHROCYTE [DISTWIDTH] IN BLOOD BY AUTOMATED COUNT: 13.4 % (ref 11.5–14)
HCT VFR BLD CALC: 43.5 % (ref 37.9–51)
HGB BLD-MCNC: 14.5 G/DL (ref 13.5–17)
MCH RBC QN AUTO: 27.8 PG (ref 27–33.4)
MCHC RBC AUTO-ENTMCNC: 33.3 G/DL (ref 32–36)
MCV RBC AUTO: 84 FL (ref 80–97)
MONOCYTES % (MANUAL): 6 % (ref 3–13)
NEUTS BAND NFR BLD MANUAL: 3 % (ref 3–5)
PLATELET # BLD: 360 10^3/UL (ref 150–450)
PLATELET COMMENT: ADEQUATE
POLYCHROMASIA BLD QL SMEAR: SLIGHT
RBC # BLD AUTO: 5.21 10^6/UL (ref 4.35–5.55)
SEGMENTED NEUTROPHILS % (MAN): 55 % (ref 42–78)
TOTAL CELLS COUNTED BLD: 100
TOXIC GRANULES BLD QL SMEAR: (no result)
VARIANT LYMPHS NFR BLD MANUAL: 24 % (ref 13–45)
WBC # BLD AUTO: 12.8 10^3/UL (ref 4–10.5)

## 2018-04-17 RX ADMIN — PROPRANOLOL HYDROCHLORIDE SCH MG: 40 TABLET ORAL at 23:15

## 2018-04-17 RX ADMIN — HEPARIN SODIUM SCH UNIT: 5000 INJECTION, SOLUTION INTRAVENOUS; SUBCUTANEOUS at 23:15

## 2018-04-17 RX ADMIN — ACETAMINOPHEN SCH MG: 325 TABLET ORAL at 23:25

## 2018-04-17 RX ADMIN — OXYCODONE HYDROCHLORIDE PRN MG: 5 TABLET ORAL at 23:26

## 2018-04-17 RX ADMIN — HEPARIN SODIUM SCH UNIT: 5000 INJECTION, SOLUTION INTRAVENOUS; SUBCUTANEOUS at 13:23

## 2018-04-17 RX ADMIN — PROPRANOLOL HYDROCHLORIDE SCH MG: 40 TABLET ORAL at 13:28

## 2018-04-17 RX ADMIN — MEROPENEM SCH GM: 1 INJECTION INTRAVENOUS at 02:46

## 2018-04-17 RX ADMIN — HEPARIN SODIUM SCH UNIT: 5000 INJECTION, SOLUTION INTRAVENOUS; SUBCUTANEOUS at 06:18

## 2018-04-17 RX ADMIN — MEROPENEM SCH GM: 1 INJECTION INTRAVENOUS at 18:33

## 2018-04-17 RX ADMIN — DOCUSATE SODIUM SCH MG: 100 CAPSULE, LIQUID FILLED ORAL at 18:11

## 2018-04-17 RX ADMIN — DOCUSATE SODIUM SCH MG: 100 CAPSULE, LIQUID FILLED ORAL at 10:55

## 2018-04-17 RX ADMIN — ACETAMINOPHEN SCH MG: 325 TABLET ORAL at 10:22

## 2018-04-17 RX ADMIN — PROPRANOLOL HYDROCHLORIDE SCH MG: 40 TABLET ORAL at 06:18

## 2018-04-17 RX ADMIN — OXYCODONE HYDROCHLORIDE PRN MG: 5 TABLET ORAL at 10:24

## 2018-04-17 RX ADMIN — MEROPENEM SCH GM: 1 INJECTION INTRAVENOUS at 10:13

## 2018-04-17 RX ADMIN — ACETAMINOPHEN SCH MG: 325 TABLET ORAL at 08:39

## 2018-04-17 RX ADMIN — DOXYCYCLINE HYCLATE SCH MG: 100 TABLET ORAL at 23:14

## 2018-04-17 RX ADMIN — DOXYCYCLINE HYCLATE SCH MG: 100 TABLET ORAL at 10:12

## 2018-04-17 RX ADMIN — ACETAMINOPHEN SCH MG: 325 TABLET ORAL at 00:09

## 2018-04-17 NOTE — PDOC PROGRESS REPORT
Subjective


Progress Note for:: 04/17/18


Subjective:: 





Patient is feeling better


There is less redness and swelling of the left testis


Is a lot more comfortable


No fever no chills


Reason For Visit: 


EPIDIDYMITMS








Physical Exam


Vital Signs: 


 











Temp Pulse Resp BP Pulse Ox


 


 98.1 F   104 H  14   133/90 H  97 


 


 04/17/18 13:00  04/17/18 13:00  04/17/18 13:00  04/17/18 13:00  04/17/18 13:00








 Intake & Output











 04/16/18 04/17/18 04/18/18





 00:59 00:59 00:59


 


Intake Total 960 3695 1371


 


Output Total 1950 2250 1300


 


Balance -990 1445 71


 


Weight   103.8 kg








No acute distress alert and awake


Pupils are PERRLA extraocular motor intact


Neck supple


Heart regular rhythm no murmur no gallop


Lungs clear abdomen soft nontender


Genitals


Left testis still somewhat swollen and firm and tender on palpation


Scrotum no redness decreased swelling; decreased tenderness on palpation





Results


Laboratory Results: 


 





 04/17/18 06:39 





 04/15/18 06:51 





 











  04/17/18 04/17/18





  06:39 06:39


 


WBC  12.8 H 


 


RBC  5.21 


 


Hgb  14.5 


 


Hct  43.5 


 


MCV  84 


 


MCH  27.8 


 


MCHC  33.3 


 


RDW  13.4 


 


Plt Count  360 


 


Seg Neutrophils %  Not Reportable 


 


Lymphocytes %  Not Reportable 


 


Monocytes %  Not Reportable 


 


Eosinophils %  Not Reportable 


 


Basophils %  Not Reportable 


 


Absolute Neutrophils  Not Reportable 


 


Absolute Lymphocytes  Not Reportable 


 


Absolute Monocytes  Not Reportable 


 


Absolute Eosinophils  Not Reportable 


 


Absolute Basophils  Not Reportable 


 


C-Reactive Protein   13.3 H











Impressions: 


 





Renal Ultrasound  04/12/18 00:00


IMPRESSION:  No right or left hydronephrosis


5 to 7 mm right midpole intrarenal nonobstructive calculus


Bladder not visualized


 








Scrotum Ultrasound  04/15/18 07:00


IMPRESSION:  NORMAL TESTES WITHOUT MASS OR TORSION.


SONOGRAPHIC FINDINGS AGAIN CONSISTENT WITH LEFT-SIDED EPIDIDYMITIS WITH COMPLEX 

LEFT HYDROCELE AND SMALL RIGHT HYDROCELE.


 








Pelvis CT  04/15/18 07:52


IMPRESSION:  BILATERAL SCROTAL HYDROCELES AND SCROTAL WALL EDEMA.  OTHERWISE 

UNREMARKABLE CONTRAST-ENHANCED CT OF THE PELVIS.


 














Assessment & Plan





- Diagnosis


(1) Epididymoorchitis


Is this a current diagnosis for this admission?: Yes   





(2) Cellulitis of scrotum


Is this a current diagnosis for this admission?: Yes   





- Time


Time Spent with patient: 





Patient appears greatly improved at this time


Noted that CRP has decreased to 14


The white blood count still remains somewhat elevated


We will continue IV imipenem for another 24 hours


Reevaluate patient in a.m.


We will likely discharge patient in 24 hours on Levaquin and doxycycline


Time Spent with patient: 25-34 minutes





- Inpatient Certification


Based on my medical assessment, after consideration of the patient's 

comorbidities, presenting symptoms, or acuity I expect that the services needed 

warrant INPATIENT care.: Yes


I certify that my determination is in accordance with my understanding of 

Medicare's requirements for reasonable and necessary INPATIENT services [42 CFR 

412.3e].: Yes


Medical Necessity: Need for IV Antibiotics

## 2018-04-18 VITALS — SYSTOLIC BLOOD PRESSURE: 133 MMHG | DIASTOLIC BLOOD PRESSURE: 90 MMHG

## 2018-04-18 LAB
ABSOLUTE LYMPHOCYTES# (MANUAL): 3.4 10^3/UL (ref 0.5–4.7)
ABSOLUTE MONOCYTES # (MANUAL): 0.6 10^3/UL (ref 0.1–1.4)
ABSOLUTE NEUTROPHILS# (MANUAL): 10.1 10^3/UL (ref 1.7–8.2)
ADD MANUAL DIFF: YES
BASOPHILS NFR BLD MANUAL: 0 % (ref 0–2)
EOSINOPHIL NFR BLD MANUAL: 4 % (ref 0–6)
ERYTHROCYTE [DISTWIDTH] IN BLOOD BY AUTOMATED COUNT: 13.2 % (ref 11.5–14)
HCT VFR BLD CALC: 42.8 % (ref 37.9–51)
HGB BLD-MCNC: 14.2 G/DL (ref 13.5–17)
MCH RBC QN AUTO: 27.7 PG (ref 27–33.4)
MCHC RBC AUTO-ENTMCNC: 33.2 G/DL (ref 32–36)
MCV RBC AUTO: 83 FL (ref 80–97)
METAMYELOCYTES % (MANUAL): 3 %
MONOCYTES % (MANUAL): 4 % (ref 3–13)
MYELOCYTES % (MANUAL): 3 %
NEUTS BAND NFR BLD MANUAL: 3 % (ref 3–5)
PATH REV BLD -IMP: (no result)
PLATELET # BLD: 351 10^3/UL (ref 150–450)
PLATELET COMMENT: ADEQUATE
PROMYELOCYTES % (MANUAL): 1 %
RBC # BLD AUTO: 5.13 10^6/UL (ref 4.35–5.55)
RBC MORPH BLD: (no result)
SEGMENTED NEUTROPHILS % (MAN): 59 % (ref 42–78)
TOTAL CELLS COUNTED BLD: 100
TOXIC GRANULES BLD QL SMEAR: (no result)
VARIANT LYMPHS NFR BLD MANUAL: 19 % (ref 13–45)
WBC # BLD AUTO: 14.6 10^3/UL (ref 4–10.5)

## 2018-04-18 RX ADMIN — PROPRANOLOL HYDROCHLORIDE SCH MG: 40 TABLET ORAL at 06:27

## 2018-04-18 RX ADMIN — ACETAMINOPHEN SCH MG: 325 TABLET ORAL at 08:35

## 2018-04-18 RX ADMIN — HEPARIN SODIUM SCH UNIT: 5000 INJECTION, SOLUTION INTRAVENOUS; SUBCUTANEOUS at 06:29

## 2018-04-18 RX ADMIN — MEROPENEM SCH GM: 1 INJECTION INTRAVENOUS at 09:28

## 2018-04-18 RX ADMIN — MEROPENEM SCH GM: 1 INJECTION INTRAVENOUS at 01:50

## 2018-04-18 RX ADMIN — DOCUSATE SODIUM SCH MG: 100 CAPSULE, LIQUID FILLED ORAL at 09:11

## 2018-04-18 RX ADMIN — DOXYCYCLINE HYCLATE SCH MG: 100 TABLET ORAL at 09:28

## 2018-04-19 NOTE — PDOC DISCHARGE SUMMARY
General





- Admit/Disc Date/PCP


Admission Date/Primary Care Provider: 


  04/11/18 01:59





  MERON DENTON MD





Discharge Date: 04/18/18





- Discharge Diagnosis


(1) Epididymoorchitis


Is this a current diagnosis for this admission?: Yes   





(2) Cellulitis of scrotum


Is this a current diagnosis for this admission?: Yes   





- Additional Information


Resuscitation Status: Full Code


Discharge Diet: As Tolerated


Discharge Activity: Activity As Tolerated


Prescriptions: 


Cefpodoxime Proxetil [Vantin 200 mg Tablet] 1 tab PO Q12 #28 tab


Doxycycline Hyclate [Vibramycin 100 mg Tablet] 100 mg PO Q12 #28 tablet


Home Medications: 








Propranolol HCl 40 mg PO Q8 #90 tablet 04/01/17 


Cefpodoxime Proxetil [Vantin 200 mg Tablet] 1 tab PO Q12 #28 tab 04/18/18 


Doxycycline Hyclate [Vibramycin 100 mg Tablet] 100 mg PO Q12 #28 tablet 04/18/ 18 











History of Present Illness


Patient complains of: swelling pain left testis


History of Present Illness: 





AUSTIN HARRISON is a 32 year old male with past medical history of Mishra disease 

on propanolol.  Patient presents with follow-up of left testicular epididymitis

, swelling and pain patient was seen treated and discharged 4 days ago with 

Keflex without significant improvement.  In the emergency room CBC reveals 

leukocytosis of 18,000, ultrasound reveals acute epididymitis without torsion 

he started on IV Rocephin for the hospitalist for admission.  Patient admits to 

single episode several years ago, denies exceptional STD risk, Chlamydia 

gonorrhea returned negative from 4 days ago.





Hospital Course


Hospital Course: 





1- epididymo orchitis 


scrotal US was performed showing normal testis no torsion 


Lt sided epididymitis 


comlex left hydrocele 


small rt hydrocele 


scrotal wall tickening





CT pelvis 


showed bilateral hydroceles and scrotal edema





Patient was initially treated with doxycycline - he did not improve 


the left testis was persistantly swollen ,red to touch and extremely painful


patient stated had an allergy to Levaquin 


we initiated meropenem IV and within 48 hours had marked improvement with 

decreased redness swelling and pain 


Patient was discharged on vantin and doxycycline po for 2 weeks 

















Physical Exam


Vital Signs: 


 











Temp Pulse Resp BP Pulse Ox


 


 97.8 F   57 L  16   133/90 H  96 


 


 04/18/18 10:26  04/18/18 10:26  04/18/18 10:26  04/18/18 10:26  04/18/18 10:26








 Intake & Output











 04/18/18 04/19/18 04/20/18





 00:59 00:59 00:59


 


Intake Total 2394 1375 


 


Output Total 1800 1350 


 


Balance 594 25 


 


Weight 103.8 kg 105.9 kg 








No acute distress alert and awake


Pupils are PERRLA extraocular motor intact


Neck supple


Heart regular rhythm no murmur no gallop


Lungs clear abdomen soft nontender


Genitals


Left testis still somewhat swollen and firm and tender on palpation


Scrotum no redness decreased swelling; decreased tenderness on palpation








Results


Laboratory Results: 


 





 04/18/18 07:02 





 04/15/18 06:51 








Impressions: 


 





Renal Ultrasound  04/12/18 00:00


IMPRESSION:  No right or left hydronephrosis


5 to 7 mm right midpole intrarenal nonobstructive calculus


Bladder not visualized


 








Scrotum Ultrasound  04/15/18 07:00


IMPRESSION:  NORMAL TESTES WITHOUT MASS OR TORSION.


SONOGRAPHIC FINDINGS AGAIN CONSISTENT WITH LEFT-SIDED EPIDIDYMITIS WITH COMPLEX 

LEFT HYDROCELE AND SMALL RIGHT HYDROCELE.


 








Pelvis CT  04/15/18 07:52


IMPRESSION:  BILATERAL SCROTAL HYDROCELES AND SCROTAL WALL EDEMA.  OTHERWISE 

UNREMARKABLE CONTRAST-ENHANCED CT OF THE PELVIS.


 














Qualifiers





- *


**PATEINT BEING DISCHARGED WITH ANY OF THE FOLLOWING DIAGNOSIS?: No





Plan


Discharge Plan: 





patient was discharged and referred to Urology Clinic as an outpatient

## 2018-05-18 ENCOUNTER — HOSPITAL ENCOUNTER (EMERGENCY)
Dept: HOSPITAL 62 - ER | Age: 33
LOS: 1 days | Discharge: HOME | End: 2018-05-19
Payer: SELF-PAY

## 2018-05-18 DIAGNOSIS — Z87.891: ICD-10-CM

## 2018-05-18 DIAGNOSIS — N49.2: ICD-10-CM

## 2018-05-18 DIAGNOSIS — N50.82: ICD-10-CM

## 2018-05-18 DIAGNOSIS — I10: ICD-10-CM

## 2018-05-18 DIAGNOSIS — N45.1: Primary | ICD-10-CM

## 2018-05-18 LAB
ADD MANUAL DIFF: NO
ANION GAP SERPL CALC-SCNC: 14 MMOL/L (ref 5–19)
BASOPHILS # BLD AUTO: 0.1 10^3/UL (ref 0–0.2)
BASOPHILS NFR BLD AUTO: 0.8 % (ref 0–2)
BUN SERPL-MCNC: 17 MG/DL (ref 7–20)
CALCIUM: 10.2 MG/DL (ref 8.4–10.2)
CHLORIDE SERPL-SCNC: 105 MMOL/L (ref 98–107)
CO2 SERPL-SCNC: 25 MMOL/L (ref 22–30)
EOSINOPHIL # BLD AUTO: 0.7 10^3/UL (ref 0–0.6)
EOSINOPHIL NFR BLD AUTO: 5.6 % (ref 0–6)
ERYTHROCYTE [DISTWIDTH] IN BLOOD BY AUTOMATED COUNT: 13.9 % (ref 11.5–14)
GLUCOSE SERPL-MCNC: 102 MG/DL (ref 75–110)
HCT VFR BLD CALC: 45.4 % (ref 37.9–51)
HGB BLD-MCNC: 15.2 G/DL (ref 13.5–17)
LYMPHOCYTES # BLD AUTO: 3.7 10^3/UL (ref 0.5–4.7)
LYMPHOCYTES NFR BLD AUTO: 29.4 % (ref 13–45)
MCH RBC QN AUTO: 27.9 PG (ref 27–33.4)
MCHC RBC AUTO-ENTMCNC: 33.6 G/DL (ref 32–36)
MCV RBC AUTO: 83 FL (ref 80–97)
MONOCYTES # BLD AUTO: 0.9 10^3/UL (ref 0.1–1.4)
MONOCYTES NFR BLD AUTO: 7.4 % (ref 3–13)
NEUTROPHILS # BLD AUTO: 7.2 10^3/UL (ref 1.7–8.2)
NEUTS SEG NFR BLD AUTO: 56.8 % (ref 42–78)
PLATELET # BLD: 273 10^3/UL (ref 150–450)
POTASSIUM SERPL-SCNC: 4.3 MMOL/L (ref 3.6–5)
RBC # BLD AUTO: 5.47 10^6/UL (ref 4.35–5.55)
SODIUM SERPL-SCNC: 144 MMOL/L (ref 137–145)
TOTAL CELLS COUNTED % (AUTO): 100 %
WBC # BLD AUTO: 12.7 10^3/UL (ref 4–10.5)

## 2018-05-18 PROCEDURE — 85025 COMPLETE CBC W/AUTO DIFF WBC: CPT

## 2018-05-18 PROCEDURE — 87086 URINE CULTURE/COLONY COUNT: CPT

## 2018-05-18 PROCEDURE — 76870 US EXAM SCROTUM: CPT

## 2018-05-18 PROCEDURE — 36415 COLL VENOUS BLD VENIPUNCTURE: CPT

## 2018-05-18 PROCEDURE — 93976 VASCULAR STUDY: CPT

## 2018-05-18 PROCEDURE — 81001 URINALYSIS AUTO W/SCOPE: CPT

## 2018-05-18 PROCEDURE — 80048 BASIC METABOLIC PNL TOTAL CA: CPT

## 2018-05-18 PROCEDURE — 87591 N.GONORRHOEAE DNA AMP PROB: CPT

## 2018-05-18 PROCEDURE — 99284 EMERGENCY DEPT VISIT MOD MDM: CPT

## 2018-05-18 PROCEDURE — 87491 CHLMYD TRACH DNA AMP PROBE: CPT

## 2018-05-19 VITALS — SYSTOLIC BLOOD PRESSURE: 107 MMHG | DIASTOLIC BLOOD PRESSURE: 73 MMHG

## 2018-05-19 LAB
APPEARANCE UR: CLEAR
APTT PPP: YELLOW S
BILIRUB UR QL STRIP: NEGATIVE
CHLAM PCR: NOT DETECTED
GLUCOSE UR STRIP-MCNC: NEGATIVE MG/DL
GON PCR: NOT DETECTED
KETONES UR STRIP-MCNC: NEGATIVE MG/DL
NITRITE UR QL STRIP: NEGATIVE
PH UR STRIP: 5 [PH] (ref 5–9)
PROT UR STRIP-MCNC: NEGATIVE MG/DL
SP GR UR STRIP: 1.02
UROBILINOGEN UR-MCNC: NEGATIVE MG/DL (ref ?–2)

## 2018-05-19 NOTE — RADIOLOGY REPORT (SQ)
EXAM DESCRIPTION: Complete testicular ultrasound.



CLINICAL HISTORY: 32 years Male, testicular pain



COMPARISON: 4/15/2018



TECHNIQUE: Real-time sonographic images of the scrotal contents

obtained using a linear multi hertz transducer. Color and

spectral Doppler imaging was also obtained.



FINDINGS: 



Testicles: The right testicle measures 4.3 x 2.5 x 2.3 cm. The

left testicle measures 3.4 x 3.1 x 1.7 cm. No solid

intratesticular mass identified. Homogenous echogenicity of the

testicles.

Epididymis: The right epididymis measures 1.9 x 1.0 x 0.6 cm. The

left epididymis measures 1.3 x 1.7 x 1.0 cm. Heterogeneous

appearance of the left epididymis.

Hydrocele:No hydrocele.

Blood flow:Normal arterial and venous blood flow identified

bilaterally.



Other: Bilateral scrotal wall thickening identified. Dilated left

peritesticular veins. 



IMPRESSION:



1. Heterogeneous epididymis. This could be seen with

epididymitis.



2. Bilateral scrotal wall thickening. This could be related to

edema or cellulitis.



3. Findings suggest left varicocele.

## 2018-05-19 NOTE — ER DOCUMENT REPORT
ED General





- General


Chief Complaint: Scrotal Pain, Acute Onset


Stated Complaint: GROIN PAIN


Time Seen by Provider: 05/18/18 23:32


Notes: 





Patient is a 32-year-old male without chronic medical problems, recently 

admitted for a scrotal cellulitis as well as epididymoorchitis who presents 

with 24 hours of increasing left testicular discomfort and mild left-sided 

scrotal swelling.  He states this feels somewhat similar to when he was seen 

last month for similar symptoms although not as severe.  He notes a stabbing, 

irritating pain mostly towards the left side of the scrotum and testicle.  He 

states touching the area worsens the pain.  He has not tried him to improve the 

pain.  He never followed up with urology as recommended after being discharged.

  He does also admits to having been "spotty" with completing his antibiotics.  

He has not had any fever or constitutional symptoms, no dysuria or penile 

discharge.  No high risk sexual practices.


TRAVEL OUTSIDE OF THE [place] IN LAST 30 DAYS: No





- Related Data


Allergies/Adverse Reactions: 


 





tramadol HCl [From Ultram] Allergy (Severe, Verified 04/01/17 05:46)


 Swelling of Throat


levaquin Allergy (Uncoded 04/19/18 17:11)


 











Past Medical History





- General


Information source: Patient





- Social History


Smoking Status: Former Smoker


Chew tobacco use (# tins/day): No


Frequency of alcohol use: Occasional


Drug Abuse: None


Lives with: Alone


Family History: Other - Migraine


Patient has suicidal ideation: No


Patient has homicidal ideation: No





- Past Medical History


Cardiac Medical History: Reports: Hx Hypertension - DX MORE THAN FIVE YEARS AGO


   Denies: Hx Coronary Artery Disease, Hx Heart Attack


Pulmonary Medical History: Reports: Hx Asthma - DX AT AGE [age]


   Denies: Hx Bronchitis, Hx COPD, Hx Pneumonia


Neurological Medical History: Reports: Hx Seizures.  Denies: Hx Cerebrovascular 

Accident


Endocrine Medical History: Denies: Hx Diabetes Mellitus Type 2


Renal/ Medical History: Denies: Hx Peritoneal Dialysis


GI Medical History: Reports: Hx Irritable Bowel.  Denies: Hx Crohn's Disease, 

Hx Gastritis, Hx Gastroesophageal Reflux Disease, Hx Hepatitis


Musculoskeltal Medical History: Denies Hx Arthritis


Skin Medical History: Denies Hx MRSA


Psychiatric Medical History: Reports: Hx Anxiety - DX IN 2008, Hx Bipolar 

Disorder - DX IN 2008, Hx Depression, Hx Post Traumatic Stress Disorder - DX IN 

2008


Infectious Medical History: Denies: Hx Hepatitis, Hx MRSA


Past Surgical History: Reports: Hx Abdominal Surgery - APPENDIX, Hx Appendectomy

, Hx Orthopedic Surgery - left wrist.  Denies: Hx Adenoidectomy, Hx Pacemaker





- Immunizations


Immunizations up to date: Yes


Hx Diphtheria, Pertussis, Tetanus Vaccination: Yes





Review of Systems





- Review of Systems


Notes: 





Constitutional: Negative for fever.


HENT: Negative for sore throat.


Eyes: Negative for visual changes.


Cardiovascular: Negative for chest pain.


Respiratory: Negative for shortness of breath.


Gastrointestinal: Negative for abdominal pain, vomiting or diarrhea.


Genitourinary: Positive for left testicular pain and scrotal pain


Musculoskeletal: Negative for back pain.


Skin: Negative for rash.


Neurological: Negative for headaches, weakness or numbness.





10 point ROS negative except as marked above and in HPI.





Physical Exam





- Vital signs


Vitals: 


 











Temp Pulse BP Pulse Ox


 


 98.9 F   91   145/89 H  98 


 


 05/18/18 22:36  05/18/18 22:36  05/18/18 22:36  05/18/18 22:36











Interpretation: Hypertensive


Notes: 





PHYSICAL EXAMINATION:





GENERAL: Well-appearing, well-nourished and in no acute distress.





HEAD: Atraumatic, normocephalic.





EYES: Pupils equal round and reactive to light, extraocular movements intact, 

sclera anicteric, conjunctiva are normal.





ENT: nares patent, oropharynx clear without exudates.  Moist mucous membranes.





NECK: Normal range of motion, supple without lymphadenopathy





LUNGS: Breath sounds clear to auscultation bilaterally and equal.  No wheezes 

rales or rhonchi.





HEART: Regular rate and rhythm without murmurs





ABDOMEN: Soft, nontender, normoactive bowel sounds.  No guarding, no rebound.  

No masses appreciated.





: Global scrotal erythema, mild pain on palpation of the left epididymis as 

well as left testicle.  Cremasteric reflex again absent bilaterally.  No penile 

lesions or discharge





EXTREMITIES: Normal range of motion, no pitting or edema.  No cyanosis.





NEUROLOGICAL: No focal neurological deficits. Moves all extremities 

spontaneously and on command.





PSYCH: Normal mood, normal affect.





SKIN: Warm, Dry, normal turgor, no rashes or lesions noted.





Course





- Re-evaluation


Re-evalutation: 





05/19/18 00:04


Patient presents with recurrence of left sided scrotal and testicular pain 

similar to his prior presentation although he notes not nearly as severe.  Saw 

this patient when he initially developed orchitis but failed to respond 

cephalexin that was prescribed.  His testicle does not appeared nearly as 

swollen or edematous and is not as painful as that prior examination.  Will 

proceed with a repeat scrotal ultrasound, urinalysis and will reinitiate 

cefpodoxime and doxycycline which is what he was treated with successfully on 

his most recent hospitalization.  The patient did fail to follow-up with 

urology and have encouraged him to do so at his earliest ability as I do not 

have a ready examination for why he had such rapid recurrence of his orchitis 

which I suspect is the recurrent diagnosis today.


05/19/18 01:51


Labs unremarkable, ultrasound does show findings consistent with recurrent 

epididymitis as well as a left-sided varicocele.  Patient will be started back 

on doxycycline as well as cefpodoxime.  I have asked that he follow-up with 

urology as scheduled and if emphasized with him that he does definitively need 

to follow-up with urology especially given the rapid recurrence of today's 

presentation.  At this time will discharge with return precautions and follow-

up recommendations.  Verbal discharge instructions given a the bedside and 

opportunity for questions given. Medication warnings reviewed. Patient is in 

agreement with this plan and has verbalized understanding of return precautions 

and the need for primary care follow-up in the next 24-72 hours.





- Vital Signs


Vital signs: 


 











Temp Pulse Resp BP Pulse Ox


 


 98.5 F   79   16   107/73   98 


 


 05/19/18 02:00  05/19/18 02:00  05/19/18 02:00  05/19/18 02:00  05/18/18 22:36














- Laboratory


Result Diagrams: 


 05/18/18 23:22





 05/18/18 23:22


Laboratory results interpreted by me: 


 











  05/18/18





  23:22


 


WBC  12.7 H


 


Absolute Eosinophils  0.7 H














- Diagnostic Test


Radiology reviewed: Reports reviewed





Discharge





- Discharge


Clinical Impression: 


 Epididymoorchitis, Cellulitis of scrotum





Condition: Good


Disposition: HOME, SELF-CARE


Additional Instructions: 


Please take all antibiotics as prescribed.  Please follow-up with urology as 

originally recommended as I am worried that she had a rapid recurrence of your 

symptoms after antibiotics were completed.  Please return to emergency 

department immediately if you have worsening of your pain, increased swelling 

to the area, fever greater than 100.4F, or any other symptoms that are 

worrisome to you.


Prescriptions: 


Cefpodoxime Proxetil 200 mg PO BID #28 tablet


Doxycycline Hyclate 100 mg PO BID #28 tablet

## 2018-10-01 ENCOUNTER — HOSPITAL ENCOUNTER (EMERGENCY)
Dept: HOSPITAL 62 - ER | Age: 33
Discharge: HOME | End: 2018-10-01
Payer: SELF-PAY

## 2018-10-01 VITALS — SYSTOLIC BLOOD PRESSURE: 139 MMHG | DIASTOLIC BLOOD PRESSURE: 95 MMHG

## 2018-10-01 DIAGNOSIS — N41.9: ICD-10-CM

## 2018-10-01 DIAGNOSIS — I10: ICD-10-CM

## 2018-10-01 DIAGNOSIS — R31.9: ICD-10-CM

## 2018-10-01 DIAGNOSIS — Z88.1: ICD-10-CM

## 2018-10-01 DIAGNOSIS — N45.1: Primary | ICD-10-CM

## 2018-10-01 DIAGNOSIS — Z88.5: ICD-10-CM

## 2018-10-01 DIAGNOSIS — N50.812: ICD-10-CM

## 2018-10-01 DIAGNOSIS — J45.909: ICD-10-CM

## 2018-10-01 LAB
APPEARANCE UR: (no result)
APTT PPP: (no result) S
BILIRUB UR QL STRIP: NEGATIVE
CHLAM PCR: NOT DETECTED
GLUCOSE UR STRIP-MCNC: NEGATIVE MG/DL
GON PCR: NOT DETECTED
KETONES UR STRIP-MCNC: NEGATIVE MG/DL
NITRITE UR QL STRIP: NEGATIVE
PH UR STRIP: 6 [PH] (ref 5–9)
PROT UR STRIP-MCNC: 100 MG/DL
SP GR UR STRIP: 1.02
UROBILINOGEN UR-MCNC: NEGATIVE MG/DL (ref ?–2)

## 2018-10-01 PROCEDURE — 81001 URINALYSIS AUTO W/SCOPE: CPT

## 2018-10-01 PROCEDURE — 87591 N.GONORRHOEAE DNA AMP PROB: CPT

## 2018-10-01 PROCEDURE — 93976 VASCULAR STUDY: CPT

## 2018-10-01 PROCEDURE — 76870 US EXAM SCROTUM: CPT

## 2018-10-01 PROCEDURE — 87491 CHLMYD TRACH DNA AMP PROBE: CPT

## 2018-10-01 PROCEDURE — 99284 EMERGENCY DEPT VISIT MOD MDM: CPT

## 2018-10-01 NOTE — ER DOCUMENT REPORT
ED Medical Screen (RME)





- General


Chief Complaint: Testicular Pain


Stated Complaint: TESTICULAR PAIN,BLOOD IN URINE


Time Seen by Provider: 10/01/18 17:04


Mode of Arrival: Ambulatory


Information source: Patient


Notes: 





32-year-old male presents with left testicular pain that started 30 minutes 

prior to arrival while he was at work.  Patient states that he has had prior 

similar symptoms which required admission in April 2018.  Patient has 

associated hematuria.








I have greeted and performed a rapid initial assessment of this patient.  A 

comprehensive ED assessment and evaluation of the patient, analysis of test 

results and completion of medical decision making process we will be contacted 

by additional ED providers.








PHYSICAL EXAMINATION:





Vital signs reviewed





GENERAL: Well-appearing, well-nourished and in no acute distress.





LUNGS: No respiratory distress





Musculoskeletal: Normal range of motion





NEUROLOGICAL:  Normal speech, normal gait. 





PSYCH: Normal mood, normal affect.





SKIN: Warm, Dry, normal turgor, no rashes or lesions noted.


TRAVEL OUTSIDE OF THE [place] IN LAST 30 DAYS: No





- HPI


Onset: Just prior to arrival


Onset/Duration: Sudden


Quality of pain: Throbbing


Severity: Moderate


Associated Symptoms: Abdominal pain, Other - Hematuria


Exacerbated by: Movement


Relieved by: Denies


Similar symptoms previously: Yes


Recently seen / treated by doctor: No





- Related Data


Smoking: Non-smoker


Frequency of alcohol use: None


Drug Abuse: None


Allergies/Adverse Reactions: 


 





tramadol HCl [From Ultram] Allergy (Severe, Verified 04/01/17 05:46)


 Swelling of Throat


levaquin Allergy (Uncoded 04/19/18 17:11)


 











Past Medical History





- Past Medical History


Cardiac Medical History: Reports: Hx Hypertension - DX MORE THAN FIVE YEARS AGO


   Denies: Hx Coronary Artery Disease, Hx Heart Attack


Pulmonary Medical History: Reports: Hx Asthma - DX AT AGE [age]


   Denies: Hx Bronchitis, Hx COPD, Hx Pneumonia


Neurological Medical History: Reports: Hx Seizures.  Denies: Hx Cerebrovascular 

Accident


Endocrine Medical History: Denies: Hx Diabetes Mellitus Type 2


Renal/ Medical History: Denies: Hx Peritoneal Dialysis


GI Medical History: Reports: Hx Irritable Bowel.  Denies: Hx Crohn's Disease, 

Hx Gastritis, Hx Gastroesophageal Reflux Disease, Hx Hepatitis


Musculoskeltal Medical History: Denies Hx Arthritis


Skin Medical History: Denies Hx MRSA


Psychiatric Medical History: Reports: Hx Anxiety - DX IN 2008, Hx Bipolar 

Disorder - DX IN 2008, Hx Depression, Hx Post Traumatic Stress Disorder - DX IN 

2008


Infectious Medical History: Denies: Hx Hepatitis, Hx MRSA


Past Surgical History: Reports: Hx Abdominal Surgery - APPENDIX, Hx Appendectomy

, Hx Orthopedic Surgery - left wrist.  Denies: Hx Adenoidectomy, Hx Pacemaker





- Immunizations


Immunizations up to date: Yes


Hx Diphtheria, Pertussis, Tetanus Vaccination: Yes

## 2018-10-01 NOTE — RADIOLOGY REPORT (SQ)
EXAM DESCRIPTION:  U/S SCROTUM W/DOPPLER



COMPLETED DATE/TIME:  10/1/2018 7:44 pm



REASON FOR STUDY:  testicular pain



COMPARISON:  5/18/2018



TECHNIQUE:  Static and realtime gray scale imaging of the scrotum and testes.  Selected color Doppler
 and spectral images recorded to document blood flow.



LIMITATIONS:  None.



FINDINGS:  RIGHT:

TESTICLE: Normal size, 4.3 x 2.1 x 3 cm. Normal echotexture.  Normal blood flow.  No mass.

EPIDIDYMIS: Normal.  12 mm.  4 mm epididymal cyst.

HYDROCELE OR VARICOCELE: No.

HERNIA OR EXTRA-TESTICULAR MASS: No.

OTHER: No other significant finding.

LEFT:

TESTICLE: Normal size,. Normal echotexture.  Normal blood flow.  No mass.

EPIDIDYMIS: 11.6 mm.  The epididymis is heterogeneous.

HYDROCELE OR VARICOCELE: No.

HERNIA OR EXTRA-TESTICULAR MASS: No.

OTHER: No other significant finding.



IMPRESSION:  Heterogeneous left epididymis may suggest epididymitis.  Cannot exclude neoplasm, likely
 benign.



TECHNICAL DOCUMENTATION:  JOB ID:  [id number]

 [employer]- All Rights Reserved



Reading location - IP/workstation name: [email or web address]

## 2018-10-01 NOTE — ER DOCUMENT REPORT
ED General





- General


Chief Complaint: Testicular Pain


Stated Complaint: TESTICULAR PAIN,BLOOD IN URINE


Time Seen by Provider: 10/01/18 17:04


Mode of Arrival: Ambulatory


Notes: 





Patient is a 32-year-old male that presents to the emergency department for 

chief complaint of left testicular pain.  Patient has a history of recurrent 

left epididymitis and orchitis in the past.  He states that this started over 

the past several days, and progressed to the point where he decided come to the 

emergency department.  He states he always has off and on left testicular pain 

since April when he was hospitalized for it.  He states he has been having a 

sharp pain that radiates up into the groin from the left testicle, and he 

thinks that his prostate may be involved to be started having hematuria today.  

He denies any recent sexual activity, denies any urethral discharge.  He also 

denies noting any fevers, chills, night sweats, testicular swelling.





Past Medical History: Orchitis, pots syndrome


Past Surgical History: Appendectomy


Social History: Admits to smoking cigarettes, and occasional alcohol use, 

denies illicit drug use.


Family History: Reviewed and noncontributory for presenting illness


Allergies: Reviewed, see documented allergy list. 





REVIEW OF SYSTEMS:


Unless otherwise stated in this report the patient's positive and negative 

responses for review of systems for constitutional, eyes, ENT, cardiovascular, 

respiratory, gastrointestinal, neurological, genitourinary, musculoskeletal, 

and integumentary systems and related systems to the presenting problem are 

either as stated in the HPI or were not pertinent or were negative for the 

symptoms and/or complaints related to the presenting medical problem.





PHYSICAL EXAMINATION:





Vital signs reviewed, nursing noted reviewed. 





GENERAL: Well-appearing, well-nourished and in no acute distress.





HEAD: Atraumatic, normocephalic.





EYES: Eyes appear normal, extraocular movements intact, sclera anicteric, 

conjunctiva are normal.





ENT: nares patent, oropharynx clear without exudates.  Moist mucous membranes.





NECK: Normal range of motion, supple without lymphadenopathy





LUNGS: Breath sounds clear to auscultation bilaterally and equal.  No wheezes 

rales or rhonchi.





HEART: Regular rate and rhythm without murmurs





ABDOMEN: Soft, nontender, normoactive bowel sounds.  No rebound, guarding, or 

rigidity. No masses appreciated.





EXTREMITIES: Nontender, good range of motion, no pitting or edema.  





Male genital: Patient has left testicular tenderness, without erythema, or 

testicular edema, there is tenderness along the left epididymis, no palpable 

hernia bilaterally, no scrotal erythema or abscess appreciated.  The rest of 

the patient's general exam is unremarkable, no blood or pus at the meatus.  

Bilateral testicles with vertical lie





NEUROLOGICAL: No focal neurological deficits. Moves all extremities 

spontaneously Motor and sensory grossly intact on exam.





PSYCH: Normal mood, normal affect.





SKIN: Warm, Dry, normal turgor, no rashes or lesions noted on exposed skin





TRAVEL OUTSIDE OF THE [place] IN LAST 30 DAYS: No





- Related Data


Allergies/Adverse Reactions: 


 





tramadol HCl [From Ultram] Allergy (Severe, Verified 04/01/17 05:46)


 Swelling of Throat


levaquin Allergy (Uncoded 04/19/18 17:11)


 











Past Medical History





- General


Information source: Patient





- Social History


Smoking Status: Never Smoker


Chew tobacco use (# tins/day): No


Frequency of alcohol use: None


Drug Abuse: None


Family History: Other - Migraine


Patient has suicidal ideation: No


Patient has homicidal ideation: No





- Past Medical History


Cardiac Medical History: Reports: Hx Hypertension - DX MORE THAN FIVE YEARS AGO


   Denies: Hx Coronary Artery Disease, Hx Heart Attack


Pulmonary Medical History: Reports: Hx Asthma - DX AT AGE [age]


   Denies: Hx Bronchitis, Hx COPD, Hx Pneumonia


Neurological Medical History: Reports: Hx Seizures.  Denies: Hx Cerebrovascular 

Accident


Endocrine Medical History: Denies: Hx Diabetes Mellitus Type 2


Renal/ Medical History: Denies: Hx Peritoneal Dialysis


GI Medical History: Reports: Hx Irritable Bowel.  Denies: Hx Crohn's Disease, 

Hx Gastritis, Hx Gastroesophageal Reflux Disease, Hx Hepatitis


Musculoskeletal Medical History: Denies Hx Arthritis


Skin Medical History: Denies Hx MRSA


Psychiatric Medical History: Reports: Hx Anxiety - DX IN 2008, Hx Bipolar 

Disorder - DX IN 2008, Hx Depression, Hx Post Traumatic Stress Disorder - DX IN 

2008


Infectious Medical History: Denies: Hx Hepatitis, Hx MRSA


Past Surgical History: Reports: Hx Abdominal Surgery - APPENDIX, Hx Appendectomy

, Hx Orthopedic Surgery - left wrist.  Denies: Hx Adenoidectomy, Hx Pacemaker





- Immunizations


Immunizations up to date: Yes


Hx Diphtheria, Pertussis, Tetanus Vaccination: Yes





Physical Exam





- Vital signs


Vitals: 


 











Temp Pulse Resp BP Pulse Ox


 


 98.8 F   88   18   148/96 H  100 


 


 10/01/18 17:17  10/01/18 17:17  10/01/18 17:17  10/01/18 17:17  10/01/18 17:17














Course





- Re-evaluation


Re-evalutation: 





Patient seen and examined vital signs reviewed. 





Laboratory data and imaging were ordered as appropriate for the patient's 

presenting symptoms and complaint, with consideration of any critical or life 

threatening conditions that may be associated with their obtained history and 

exam as noted above.





Patient was treated with Percocet 5 mg, and started on ciprofloxacin 500 mg p.o.





Results were reviewed when available and demonstrated left epididymitis by 

scrotal ultrasound





The patient was re-evaluated and was improved and stable





Evaluation was most consistent with left epididymitis, will discharge the 

patient home on ciprofloxacin for 14 days, as he may have a component of 

prostatitis given he had hematuria.  He is advised to follow-up with urology.





Results were discussed with the patient at this point, after careful 

consideration I feel that that patient can be discharged from the emergency 

department, the patient was educated treatments and reasons to return to the 

emergency department based on their presumed diagnosis as noted above, they 

were advised to followup with a primary care physician in 2-3 days. Patient was 

agreeable to plan of care.





*Note is created using voice recognition software and may contain spelling, 

syntax or grammatical errors.


10/01/18 20:46








Laboratory











  10/01/18 10/01/18





  17:35 17:35


 


Urine Color  BANDAR 


 


Urine Appearance  SLIGHTLY-CLOUDY 


 


Urine pH  6.0 


 


Ur Specific Gravity  1.021 


 


Urine Protein  100 H 


 


Urine Glucose (UA)  NEGATIVE 


 


Urine Ketones  NEGATIVE 


 


Urine Blood  LARGE H 


 


Urine Nitrite  NEGATIVE 


 


Urine Bilirubin  NEGATIVE 


 


Urine Urobilinogen  NEGATIVE 


 


Ur Leukocyte Esterase  NEGATIVE 


 


Urine WBC (Auto)  1 


 


Urine RBC (Auto)  >182 


 


Squamous Epi Cells Auto  <1 


 


Urine Mucus (Auto)  FEW 


 


Urine Ascorbic Acid  NEGATIVE 


 


Chlamydia DNA (PCR)   NOT DETECTED


 


N.gonorrhoeae DNA (PCR)   NOT DETECTED














 





Scrotum Ultrasound  10/01/18 17:04


IMPRESSION:  Heterogeneous left epididymis may suggest epididymitis.  Cannot 

exclude neoplasm, likely benign.


 














- Vital Signs


Vital signs: 


 











Temp Pulse Resp BP Pulse Ox


 


 98.8 F   88   18   148/96 H  100 


 


 10/01/18 17:17  10/01/18 17:17  10/01/18 17:17  10/01/18 17:17  10/01/18 17:17














- Laboratory


Laboratory results interpreted by me: 


 











  10/01/18





  17:35


 


Urine Protein  100 H


 


Urine Blood  LARGE H














Discharge





- Discharge


Clinical Impression: 


 Epididymitis without abscess





Prostatitis


Qualifiers:


 Prostatitis type: unspecified Qualified Code(s): N41.9 - Inflammatory disease 

of prostate, unspecified





Condition: Stable


Disposition: HOME, SELF-CARE


Instructions:  Epididymitis ([facility])


Additional Instructions: 


Please return to the emergency department if you have any worsening, or concern 

of your symptoms.





Please return to the emergency department if you develop chest pain, difficulty 

breathing, severe abdominal pain, or ongoing vomiting.





Please follow-up with your primary care physician in 2-3 days and any other 

recommended physicians.





If prescribed, take all medications as directed.  





If you have any questions or concerns do not hesitate to return the emergency 

department for evaluation. 





Please follow-up with urology, call for appointment, take the full course of 

antibiotics.





Do not perform any exert of exercise activity while taking this antibiotic, 

avoid any calcium supplements, or milk when using this product as it will make 

it less effective.


Prescriptions: 


Ciprofloxacin HCl [Cipro 500 mg Tablet] 500 mg PO BID #28 tablet


Naproxen [Naprosyn] 500 mg PO Q12H PRN #30 tablet


 PRN Reason: general pain


Referrals: 


CHRIS DENTON DO [Primary Care Provider] - Follow up in 3-5 days


LAZARO HEREDIA MD [NO LOCAL MD] - Follow up in 3-5 days (urology)

## 2020-07-11 NOTE — ER DOCUMENT REPORT
ED GI Bleed / Rectal Pain





- General


Chief Complaint: Hemorrhoids


Stated Complaint: ABDOMINAL PAIN,RECTAL PAIN


Time Seen by Provider: 07/11/20 09:10


Primary Care Provider: 


CLEEMNTE LAZO DO [Primary Care Provider] - Follow up as needed


Notes: 





CHIEF COMPLAINT: Lower abdominal pain, hemorrhoids





HPI: 34-year-old male presenting to the emergency department complaining of 

bilateral lower abdominal pain that began yesterday.  Patient also complaining 

of a hemorrhoid that began yesterday and complaining of rectal pain with same.  

No fever.  No vomiting.





ROS: See HPI - all other systems were reviewed and are otherwise negative


Constitutional: no fever 


Eyes: no drainage, no blurred vision


ENT: no runny nose, no sore throat


Cardiovascular:  no chest pain 


Resp: no SOB, no cough


GI: no vomiting, no diarrhea, + abdominal pain


: no dysuria


Integumentary: no rash 


Allergy: no hives 


Musculoskeletal: no extremity pain or swelling 


Neurological: no numbness/tingling, no weakness





MEDICATIONS: I agree with the patient medications as charted by the RN.





ALLERGIES: I agree with the allergies as charted by the RN.





PAST MEDICAL HISTORY/PAST SURGICAL HISTORY: Reviewed and agree as charted by RN.





SOCIAL HISTORY: Reviewed and agree as charted by RN.





FAMILY HISTORY: No significant familial comorbid conditions directly related to 

patient complaint





EXAM:


Reviewed vital signs as charted by RN.


CONSTITUTIONAL: Alert and oriented and responds appropriately to questions. 

Well-appearing; well-nourished


HEAD: Normocephalic; atraumatic


EYES:  Conjunctivae clear, sclerae non-icteric


ENT: normal nose; no rhinorrhea; moist mucous membranes


NECK: Supple without meningismus


CARD: RRR; no murmurs, no clicks, no rubs, no gallops; symmetric distal pulses


RESP: Normal chest excursion without splinting or tachypnea; breath sounds clear

and equal bilaterally; no wheezes, no rhonchi, no rales, pulse oximetry 100% on 

room air not hypoxic


ABD/GI: Normal bowel sounds; non-distended; soft, mild tenderness across the 

lower abdomen on palpation, no rebound, no guarding; no palpable organomegaly or

masses.


Rectal: Small hemorrhoid is noted at 3:00, nonthrombosed.  No visible bleeding, 

good rectal tone.


BACK:  The back appears normal and is non-tender to palpation, there is no CVA 

tenderness


EXT: Normal ROM in all joints; non-tender to palpation; no cyanosis, no 

effusions, no edema   


SKIN: Normal color for age and race; warm; dry; good turgor; no acute lesions 

noted


NEURO: Moves all extremities equally; Motor and sensory function intact 


PSYCH: The patient's mood and manner are appropriate. Grooming and personal hy

giene are appropriate.





MDM: 34-year-old male lower abdominal pain that began yesterday.  Will obtain 

lab work and imaging to evaluate for diverticulitis.  Also with a small 

hemorrhoid.  No intervention necessary for this today will likely need referral 

to surgery for removal if desired


TRAVEL OUTSIDE OF THE [place] IN LAST 30 DAYS: No





- Related Data


Allergies/Adverse Reactions: 


                                        





tramadol HCl [From Ultram] Allergy (Severe, Verified 07/11/20 08:24)


   Swelling of Throat


levaquin Allergy (Uncoded 04/19/18 17:11)


   











Past Medical History





- Social History


Smoking Status: Former Smoker


Frequency of alcohol use: None


Drug Abuse: Other


Family History: Other - Migraine


Patient has homicidal ideation: No





- Past Medical History


Cardiac Medical History: Reports: Hx Hypertension


   Denies: Hx Coronary Artery Disease, Hx Heart Attack


Pulmonary Medical History: Reports: Hx Asthma - DX AT AGE [age]


   Denies: Hx Bronchitis, Hx COPD, Hx Pneumonia


Neurological Medical History: Reports: Hx Seizures.  Denies: Hx Cerebrovascular 

Accident


Endocrine Medical History: Denies: Hx Diabetes Mellitus Type 2


Renal/ Medical History: Denies: Hx Peritoneal Dialysis


GI Medical History: Reports: Hx Irritable Bowel.  Denies: Hx Crohn's Disease, Hx

Gastritis, Hx Gastroesophageal Reflux Disease, Hx Hepatitis


Musculoskeletal Medical History: Denies Hx Arthritis


Skin Medical History: Denies Hx MRSA


Psychiatric Medical History: Reports: Hx Anxiety - DX IN 2008, Hx Bipolar 

Disorder - DX IN 2008, Hx Depression, Hx Post Traumatic Stress Disorder - DX IN 

2008


Infectious Medical History: Denies: Hx Hepatitis, Hx MRSA


Past Surgical History: Reports: Hx Abdominal Surgery - APPENDIX, Hx 

Appendectomy, Hx Orthopedic Surgery - left wrist.  Denies: Hx Adenoidectomy, Hx 

Pacemaker





- Immunizations


Immunizations up to date: Yes


Hx Diphtheria, Pertussis, Tetanus Vaccination: Yes





Physical Exam





- Vital signs


Vitals: 


                                        











Temp Pulse Resp BP Pulse Ox


 


 98.8 F   101 H  20   146/106 H  97 


 


 07/11/20 07:53  07/11/20 07:53  07/11/20 07:53  07/11/20 07:53  07/11/20 07:53














Course





- Re-evaluation


Re-evalutation: 





07/11/20 11:17


CT shows constipation and a small right inguinal hernia.  Discussed with the 

patient.  He has Metamucil at home which he prefers to take.  We will write him 

a short course of pain medication, surgical referral if needed.  He is moving to

[place] in 2 weeks if the Anusol resolves the hemorrhoid he may follow-up there





- Vital Signs


Vital signs: 


                                        











Temp Pulse Resp BP Pulse Ox


 


 98.8 F   101 H  20   146/106 H  97 


 


 07/11/20 08:10  07/11/20 07:53  07/11/20 07:53  07/11/20 07:53  07/11/20 07:53














- Laboratory


Result Diagrams: 


                                 07/11/20 09:10





                                 07/11/20 09:10


Laboratory results interpreted by me: 


                                        











  07/11/20





  09:10


 


Eos % (Auto)  7.4 H














Discharge





- Discharge


Clinical Impression: 


 Abdominal pain, bilateral lower quadrant, Inguinal hernia, right





Hemorrhoid


Qualifiers:


 Hemorrhoid type: unspecified Qualified Code(s): K64.9 - Unspecified hemorrhoids





Constipation


Qualifiers:


 Constipation type: unspecified constipation type Qualified Code(s): K59.00 - 

Constipation, unspecified





Condition: Stable


Disposition: HOME, SELF-CARE


Additional Instructions: 


Take the medications as prescribed no driving if taking narcotics for pain.  

Remember that narcotics will make you more constipated.  Use the Anusol 

suppositories as prescribed.  Follow-up with surgery clinic for further 

evaluation of the hemorrhoids as needed


Prescriptions: 


Hydrocortisone Acetate [Anusol Hc 25 mg Supp.rect] 1 supp.rect [place] BID #14 

supp.rect


Hydrocodone/Acetaminophen [Norco 5-325 mg Tablet] 1 tab PO Q4 PRN #10 tablet


 PRN Reason: 


Referrals: 


CLEMENTE LAZO DO [Primary Care Provider] - Follow up as needed


THANG GOMEZ MD [ACTIVE STAFF] - Follow up as needed

## 2020-07-11 NOTE — RADIOLOGY REPORT ([facility])
EXAM DESCRIPTION:  CT ABD/PELVIS WITH IV ONLY



IMAGES COMPLETED DATE/TIME:  7/11/2020 10:36 am



REASON FOR STUDY:  lower abd pain



COMPARISON:  None.



TECHNIQUE:  CT scan of the abdomen and pelvis performed using helical scanning technique with dynamic
 intravenous contrast injection.  No oral contrast. Images reviewed with lung, soft tissue, and bone 
windows. Reconstructed coronal and sagittal MPR images reviewed. Delayed images for evaluation of the
 urinary system also acquired. All images stored on PACS.

All CT scanners at this facility use dose modulation, iterative reconstruction, and/or weight based d
osing when appropriate to reduce radiation dose to as low as reasonably achievable (ALARA).

[facility]: Dose Right  [facility]: CareDose    [facility]: Dose Right    [facility]: Teradose 4D    [facility]: Smart Technologies



CONTRAST TYPE AND DOSE:  contrast/concentration: Isovue 350.00 mmol/ml; Total Contrast Delivered: 100
.0 ml; Total Saline Delivered: 72.0 ml

100 mL Omnipaque 350- low osmolar.



RENAL FUNCTION:  BUN 17, creatinine 0.95



RADIATION DOSE:  CT Rad equipment meets quality standard of care and radiation dose reduction techniq
ues were employed. CTDIvol: 10.4 - 14.0 mGy. DLP: 1343 mGy-cm..



LIMITATIONS:  None.



FINDINGS:  LOWER CHEST: No significant findings. No nodules or infiltrates.

LIVER: Normal size. No masses.  No dilated ducts.

SPLEEN: Normal size. No focal lesions.

PANCREAS: No masses. No significant calcifications. No adjacent inflammation or peripancreatic fluid 
collections. Pancreatic duct not dilated.

GALLBLADDER: No identified stones by CT criteria. No inflammatory changes to suggest cholecystitis.

ADRENAL GLANDS: No significant masses or asymmetry.

RIGHT KIDNEY AND URETER: No solid masses.  1.2 cm interpolar region cyst.  3 mm inferior pole medulla
ry calcification.   No hydronephrosis or hydroureter.

LEFT KIDNEY AND URETER: No solid masses.  1 cm inferior pole cyst.  No significant calcifications.   
No hydronephrosis or hydroureter.

AORTA AND VESSELS: No aneurysm. No dissection. Renal arteries, SMA, celiac without stenosis.

RETROPERITONEUM: No retroperitoneal adenopathy, hemorrhage or masses.

BOWEL AND PERITONEAL CAVITY: No masses or inflammatory changes. No free fluid or peritoneal masses.  
Fecalized distal ileum.  Gas and stool throughout the colon.  No dilated bowel.

APPENDIX: Not visualized.

PELVIS: No mass.  No free fluid. Normal bladder.

ABDOMINAL WALL: No masses.  2.9 x 2.5 cm fat containing right inguinal hernia.

BONES: No significant or acute findings.

OTHER: No other significant finding.



IMPRESSION:  Fecalized distal ileum, possibly representing delayed transit.  No findings of bowel obs
truction.

2.9 x 2.5 cm fat containing right inguinal hernia.



TECHNICAL DOCUMENTATION:  JOB ID:  [id number]

Quality ID # [id number]: Final reports with documentation of one or more dose reduction techniques (e.g., Au
tomated exposure control, adjustment of the mA and/or kV according to patient size, use of iterative 
reconstruction technique)

 2011 [employer]- All Rights Reserved



Reading location - IP/workstation name: [email or web address]

## 2024-01-04 NOTE — PDOC PROGRESS REPORT
Subjective


Progress Note for:: 04/15/18


Subjective:: 





Patient is still complaining of moderate pain left testis


The swelling and redness have gone down but certainly are still present


There is no fever no chills


The white blood count is now 11.4


Patient was switched to p.o. doxycycline yesterday


Reason For Visit: 


EPIDIDYMITMS








Physical Exam


Vital Signs: 


 











Temp Pulse Resp BP Pulse Ox


 


 97.6 F   70   18   117/82   96 


 


 04/15/18 07:24  04/15/18 07:24  04/15/18 07:24  04/15/18 07:24  04/15/18 07:24








 Intake & Output











 04/14/18 04/15/18 04/16/18





 00:59 00:59 00:59


 


Intake Total 1600 1000 


 


Output Total 1900 2050 


 


Balance -300 -1050 


 


Weight 101.8 kg 102.7 kg 











General appearance: PRESENT: no acute distress, well-developed, well-nourished


Head exam: PRESENT: atraumatic, normocephalic


Eye exam: PRESENT: conjunctiva pink, EOMI, PERRLA.  ABSENT: scleral icterus


Neck exam: ABSENT: carotid bruit, JVD, lymphadenopathy, thyromegaly


Respiratory exam: PRESENT: clear to auscultation diamond.  ABSENT: rales, rhonchi, 

wheezes


Cardiovascular exam: PRESENT: RRR.  ABSENT: diastolic murmur, rubs, systolic 

murmur


GI/Abdominal exam: PRESENT: normal bowel sounds, soft.  ABSENT: distended, 

guarding, mass, organolmegaly, rebound, tenderness


Rectal exam: PRESENT: normal inspection, normal prostate.  ABSENT: tenderness


Gentrourinary exam: PRESENT: scrotal swelling, testicular tenderness, other - 

Still extreme swelling of the left scrotum The testes is swollen tender on 

palpation The skin of the scrotum is very red


Extremities exam: PRESENT: full ROM.  ABSENT: calf tenderness, clubbing, pedal 

edema





Results


Laboratory Results: 


 





 04/15/18 06:51 





 04/15/18 06:51 





 











  04/15/18 04/15/18





  06:51 06:51


 


WBC  11.9 H 


 


RBC  4.94 


 


Hgb  13.7 


 


Hct  41.4 


 


MCV  84 


 


MCH  27.7 


 


MCHC  33.1 


 


RDW  13.4 


 


Plt Count  376 


 


Seg Neutrophils %  54.6 


 


Lymphocytes %  26.1 


 


Monocytes %  10.3 


 


Eosinophils %  8.4 H 


 


Basophils %  0.6 


 


Absolute Neutrophils  6.5 


 


Absolute Lymphocytes  3.1 


 


Absolute Monocytes  1.2 


 


Absolute Eosinophils  1.0 H 


 


Absolute Basophils  0.1 


 


Sodium   141.9


 


Potassium   4.8


 


Chloride   105


 


Carbon Dioxide   26


 


Anion Gap   11


 


BUN   17


 


Creatinine   0.81


 


Est GFR ( Amer)   > 60


 


Est GFR (Non-Af Amer)   > 60


 


Glucose   89


 


Calcium   9.7








 





04/12/18 11:45   Penis   Gram Stain - Final


04/12/18 11:45   Penis   Urethral Culture - Final


                            Skin Adriana


                            No Neisseria Gonorrhoeae


04/12/18 13:00   Clean Catch Midstream   Urine Culture - Final


                            NO GROWTH 2 DAYS








Impressions: 


 





Scrotum Ultrasound  04/10/18 22:10


IMPRESSION:


 


1. Moderate left epididymoorchitis. Asymmetric left paracentral


scrotal wall thickening measures 1.6 cm and may indicate scrotal


cellulitis.


2. No testicular torsion or mass.


 








Renal Ultrasound  04/12/18 00:00


IMPRESSION:  No right or left hydronephrosis


5 to 7 mm right midpole intrarenal nonobstructive calculus


Bladder not visualized


 














Assessment & Plan





- Diagnosis


(1) Epididymoorchitis


Is this a current diagnosis for this admission?: Yes   





(2) Cellulitis of scrotum


Is this a current diagnosis for this admission?: Yes   





- Time


Time Spent with patient: 





Patient had multiple test and has been now treated for almost 10 days without 

much improvement


Tests for chlamydia and GC were negative


Rectal examination today is not suggestive of an acute prostatitis and or 

perirectal abscess





Patient's management needs to be reevaluated


Patient has epididymo-orchitis and scrotal cellulitis





Ultrasound of the scrotum will be performed as planned


We will also order CT of the pelvis with IV contrast





We will speak with urology and/or infectious disease when results are available


Time Spent with patient: 25-34 minutes No